# Patient Record
Sex: FEMALE | Race: WHITE | NOT HISPANIC OR LATINO | URBAN - METROPOLITAN AREA
[De-identification: names, ages, dates, MRNs, and addresses within clinical notes are randomized per-mention and may not be internally consistent; named-entity substitution may affect disease eponyms.]

---
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
## 2023-03-13 ENCOUNTER — OFFICE VISIT (OUTPATIENT)
Dept: FAMILY MEDICINE CLINIC | Facility: CLINIC | Age: 23
End: 2023-03-13
Payer: COMMERCIAL

## 2023-03-13 VITALS
TEMPERATURE: 98 F | WEIGHT: 217.63 LBS | RESPIRATION RATE: 18 BRPM | HEIGHT: 72 IN | OXYGEN SATURATION: 96 % | SYSTOLIC BLOOD PRESSURE: 118 MMHG | BODY MASS INDEX: 29.48 KG/M2 | HEART RATE: 88 BPM | DIASTOLIC BLOOD PRESSURE: 72 MMHG

## 2023-03-13 DIAGNOSIS — Z13.228 SCREENING FOR ENDOCRINE, METABOLIC, AND IMMUNITY DISORDER: ICD-10-CM

## 2023-03-13 DIAGNOSIS — Z13.29 SCREENING FOR ENDOCRINE, METABOLIC, AND IMMUNITY DISORDER: ICD-10-CM

## 2023-03-13 DIAGNOSIS — F42.9 OBSESSIVE-COMPULSIVE DISORDER, UNSPECIFIED TYPE: ICD-10-CM

## 2023-03-13 DIAGNOSIS — E66.3 OVERWEIGHT (BMI 25.0-29.9): ICD-10-CM

## 2023-03-13 DIAGNOSIS — Z13.6 SCREENING FOR CARDIOVASCULAR CONDITION: ICD-10-CM

## 2023-03-13 DIAGNOSIS — Z00.00 ANNUAL PHYSICAL EXAM: Primary | ICD-10-CM

## 2023-03-13 DIAGNOSIS — Z13.0 SCREENING FOR ENDOCRINE, METABOLIC, AND IMMUNITY DISORDER: ICD-10-CM

## 2023-03-13 DIAGNOSIS — F41.9 ANXIETY: ICD-10-CM

## 2023-03-13 PROBLEM — J30.2 SEASONAL ALLERGIES: Status: ACTIVE | Noted: 2023-03-13

## 2023-03-13 PROBLEM — K21.9 GASTROESOPHAGEAL REFLUX DISEASE: Status: ACTIVE | Noted: 2023-03-13

## 2023-03-13 PROCEDURE — 3078F DIAST BP <80 MM HG: CPT | Performed by: FAMILY MEDICINE

## 2023-03-13 PROCEDURE — 3008F BODY MASS INDEX DOCD: CPT | Performed by: FAMILY MEDICINE

## 2023-03-13 PROCEDURE — 99385 PREV VISIT NEW AGE 18-39: CPT | Performed by: FAMILY MEDICINE

## 2023-03-13 PROCEDURE — 3074F SYST BP LT 130 MM HG: CPT | Performed by: FAMILY MEDICINE

## 2023-03-13 RX ORDER — FLUOXETINE 10 MG/1
10 CAPSULE ORAL DAILY
COMMUNITY
Start: 2022-10-25 | End: 2023-03-13

## 2023-03-13 RX ORDER — OMEPRAZOLE 20 MG/1
20 CAPSULE, DELAYED RELEASE ORAL DAILY
COMMUNITY
Start: 2022-09-19 | End: 2023-03-13 | Stop reason: ALTCHOICE

## 2023-03-13 RX ORDER — FLUOXETINE HYDROCHLORIDE 20 MG/1
20 CAPSULE ORAL DAILY
Qty: 90 CAPSULE | Refills: 1 | Status: SHIPPED | OUTPATIENT
Start: 2023-03-13

## 2023-03-13 RX ORDER — ALUMINUM HYDROXIDE, MAGNESIUM HYDROXIDE, SIMETHICONE 400; 400; 40 MG/10ML; MG/10ML; MG/10ML
SUSPENSION ORAL
COMMUNITY
Start: 2022-09-19 | End: 2023-03-13 | Stop reason: ALTCHOICE

## 2023-03-13 RX ORDER — FLUOXETINE 10 MG/1
10 CAPSULE ORAL DAILY
Qty: 90 CAPSULE | Refills: 1 | Status: SHIPPED | OUTPATIENT
Start: 2023-03-13

## 2023-03-13 RX ORDER — ETONOGESTREL AND ETHINYL ESTRADIOL .12; .015 MG/D; MG/D
RING VAGINAL
COMMUNITY
Start: 2023-01-16

## 2023-03-13 RX ORDER — FLUOXETINE HYDROCHLORIDE 20 MG/1
20 CAPSULE ORAL DAILY
COMMUNITY
Start: 2022-10-24 | End: 2023-03-13

## 2023-03-16 LAB
ABSOLUTE BASOPHILS: 42 CELLS/UL (ref 0–200)
ABSOLUTE EOSINOPHILS: 11 CELLS/UL (ref 15–500)
ABSOLUTE LYMPHOCYTES: 2968 CELLS/UL (ref 850–3900)
ABSOLUTE MONOCYTES: 456 CELLS/UL (ref 200–950)
ABSOLUTE NEUTROPHILS: 1823 CELLS/UL (ref 1500–7800)
ALBUMIN/GLOBULIN RATIO: 2 (CALC) (ref 1–2.5)
ALBUMIN: 4.3 G/DL (ref 3.6–5.1)
ALKALINE PHOSPHATASE: 48 U/L (ref 31–125)
ALT: 13 U/L (ref 6–29)
AST: 18 U/L (ref 10–30)
BASOPHILS: 0.8 %
BILIRUBIN, TOTAL: 0.6 MG/DL (ref 0.2–1.2)
BUN: 10 MG/DL (ref 7–25)
CALCIUM: 9.7 MG/DL (ref 8.6–10.2)
CARBON DIOXIDE: 23 MMOL/L (ref 20–32)
CHLORIDE: 107 MMOL/L (ref 98–110)
CHOL/HDLC RATIO: 2.7 (CALC)
CHOLESTEROL, TOTAL: 159 MG/DL
CREATININE: 0.8 MG/DL (ref 0.5–0.96)
EGFR: 107 ML/MIN/1.73M2
EOSINOPHILS: 0.2 %
GLOBULIN: 2.1 G/DL (CALC) (ref 1.9–3.7)
GLUCOSE: 87 MG/DL (ref 65–99)
HDL CHOLESTEROL: 58 MG/DL
HEMATOCRIT: 43.6 % (ref 35–45)
HEMOGLOBIN: 14.3 G/DL (ref 11.7–15.5)
LDL-CHOLESTEROL: 87 MG/DL (CALC)
LYMPHOCYTES: 56 %
MCH: 30.6 PG (ref 27–33)
MCHC: 32.8 G/DL (ref 32–36)
MCV: 93.4 FL (ref 80–100)
MONOCYTES: 8.6 %
MPV: 10.8 FL (ref 7.5–12.5)
NEUTROPHILS: 34.4 %
NON-HDL CHOLESTEROL: 101 MG/DL (CALC)
PLATELET COUNT: 339 THOUSAND/UL (ref 140–400)
POTASSIUM: 4.3 MMOL/L (ref 3.5–5.3)
PROTEIN, TOTAL: 6.4 G/DL (ref 6.1–8.1)
RDW: 12.1 % (ref 11–15)
RED BLOOD CELL COUNT: 4.67 MILLION/UL (ref 3.8–5.1)
SODIUM: 139 MMOL/L (ref 135–146)
TRIGLYCERIDES: 58 MG/DL
TSH W/REFLEX TO FT4: 1.93 MIU/L
WHITE BLOOD CELL COUNT: 5.3 THOUSAND/UL (ref 3.8–10.8)

## 2023-04-03 ENCOUNTER — PATIENT MESSAGE (OUTPATIENT)
Dept: FAMILY MEDICINE CLINIC | Facility: CLINIC | Age: 23
End: 2023-04-03

## 2023-04-03 DIAGNOSIS — E66.3 OVERWEIGHT (BMI 25.0-29.9): Primary | ICD-10-CM

## 2023-05-16 ENCOUNTER — NURSE ONLY (OUTPATIENT)
Dept: FAMILY MEDICINE CLINIC | Facility: CLINIC | Age: 23
End: 2023-05-16
Payer: COMMERCIAL

## 2023-05-16 DIAGNOSIS — Z11.1 SCREENING FOR TUBERCULOSIS: Primary | ICD-10-CM

## 2023-05-16 PROCEDURE — 86580 TB INTRADERMAL TEST: CPT | Performed by: FAMILY MEDICINE

## 2023-05-18 ENCOUNTER — NURSE ONLY (OUTPATIENT)
Dept: FAMILY MEDICINE CLINIC | Facility: CLINIC | Age: 23
End: 2023-05-18
Payer: COMMERCIAL

## 2023-05-18 LAB — INDURATION (): NEGATIVE MM (ref 0–11)

## 2023-05-22 ENCOUNTER — OFFICE VISIT (OUTPATIENT)
Dept: INTERNAL MEDICINE CLINIC | Facility: CLINIC | Age: 23
End: 2023-05-22
Payer: COMMERCIAL

## 2023-05-22 VITALS
SYSTOLIC BLOOD PRESSURE: 100 MMHG | HEIGHT: 70.5 IN | DIASTOLIC BLOOD PRESSURE: 68 MMHG | HEART RATE: 72 BPM | RESPIRATION RATE: 18 BRPM | BODY MASS INDEX: 30.58 KG/M2 | OXYGEN SATURATION: 98 % | WEIGHT: 216 LBS

## 2023-05-22 DIAGNOSIS — F41.9 ANXIETY: ICD-10-CM

## 2023-05-22 DIAGNOSIS — Z51.81 THERAPEUTIC DRUG MONITORING: Primary | ICD-10-CM

## 2023-05-22 DIAGNOSIS — E66.9 OBESITY (BMI 30-39.9): ICD-10-CM

## 2023-05-22 PROBLEM — D64.9 ANEMIA: Status: ACTIVE | Noted: 2023-05-22

## 2023-05-22 PROCEDURE — 3008F BODY MASS INDEX DOCD: CPT | Performed by: NURSE PRACTITIONER

## 2023-05-22 PROCEDURE — 99213 OFFICE O/P EST LOW 20 MIN: CPT | Performed by: NURSE PRACTITIONER

## 2023-05-22 PROCEDURE — 3078F DIAST BP <80 MM HG: CPT | Performed by: NURSE PRACTITIONER

## 2023-05-22 PROCEDURE — 3074F SYST BP LT 130 MM HG: CPT | Performed by: NURSE PRACTITIONER

## 2023-05-22 NOTE — PATIENT INSTRUCTIONS
Welcome to the [facility]!!  Thank you for placing your trust in our health care team, I look forward to working with you along this journey to better health! Next steps:     1.  Schedule a personal nutrition consultation with one of our registered dieticians. Bring along your food journal (3 days minimum). See journal options below. 2.  get labs and EKG done   3. Check with insurance on coverage for GLP-1 medications:  (ie. saxenda- daily, wegovy- weekly)    Please try to work on the following dietary changes this first month:  Daily protein recommendation to start:  grams  Daily carbohydrate: <130g  Daily calories: 1,600  1. Drink water with meals and throughout the day, cut down on soda and/or juice if consumed. Consider flavored water options like Bubbly, Spindrift, Hint and Zheng. 2.  Eat breakfast daily and focus on having protein with each meal, examples include: greek yogurt, cottage cheese, hard boiled egg, whole grain toast with peanut butter. 3.  Reduce refined carbohydrates and sugars which includes items such as sweets, as well as rice, pasta, and bread and make sure to choose whole grain options when having them with just 1 serving per meal about the size of your inner palm. 4.  Consume non starchy veggies daily working towards making them a good 50% of your daily food intake. Add them to lunch and dinner consistently. 5.  Optional can start a daily probiotic: VSL#3, (order on line at www.vsl3. com). Take 1 capsule daily with water for 30 days, then reduce to 1 every other day (this will reduce the cost). Capsules can be left out for 2 weeks, but then must be refrigerated. Please download allison My Fitness Orvel Dubin! Or Net Diary to monitor daily dietary intake and you will be able to see if you are eating the right amount of calories or too much or too little which would hinder weight loss.  Additionally this will help to see your daily carbohydrate and protein intake. When you set the radha up choose 1-2 lbs/week as a goal.  Keeping a paper food journal is an option as well to remain accountable for your choices- this is the start to mindful eating! A low calorie diet has been consistently shown to support weight loss. Continue or start exercising to help establish a routine. If not already exercising begin with 1 day and progress as able with long-term goal of 30 minutes 5 days a week at a minimum. Meditation daily can help manage and control stress. Chronic stress can make weight loss difficult. Exercising is one way to help with stress, but meditation using the CALM Radha or another comparable alternative can be done in your home or place of work with little time commitment. This Radha can also help work on behavior change and improve sleep. Check out the segment under Calm Masterclass and listen to The 4 Pillars of Health. A great way to begin learning about the foundation of lifestyle with practical tips to use in your every day. Check out www.yourweightmatters. org blog for continued daily support and education along this weight loss journey! Patient Resources:     Personal Training/Fitness Classes/Health Coaching     Joo Mccallum and Solares Sophiaside @ [email or web address] Full fitness center with group fitness and personal training. Discount available as client of [facility]. Health Coaching and Personal Training with Grant Lopez at our [facility]- individual weekly coaching with option to add personal training and small group fitness classes targeted at weight loss- [phone] and/or email @ [email or web address]  [email or web address] Bj [email or web address] Group Fitness [phone] and/or email Walter Chin at [email or web address]  [address] with multiple locations: German (www.Haw[place] [employer]otech), Eat The Duvall & Minor (www.eatPeople's Software Company. Sagacity Media), Inofile Fitness (www.CellPly), The Exercise  (www.exercisecoach.Sagacity Media)     Online Fitness  Fitness  on [employer] in 10 DVD series- www. bitam37RYC. Sagacity Media  Sit and Be Fit - Chair exercise series Www.sitandbefit. org  Hip Hop Fit with Kirit Forbes at www.hiphopfit. net     Apps for on DaoliCloud 7 Minute Workout (orange box with white 7) - free on the go HIIT training radha  Peloton Radha @ Access Mobile     Nutrition Trackers and Tools  LoseIT! And My Fitness Pal apps and on line for tracking nutrition  NOOM - virtual health coaching  FitFoundation (healthy meals on the go) in [employer] @ wwwLoopFuse [email or web address]. Nikki Chinchilla MD @ www.Micromidas and Viridiana Bernal (keto and low carb plans recommended) @ www. [email or web address].[facility], Metabolic Meals @ www. MyMetabolicMeals. com - individual prepared meals to go  MusicSiren, Pro[employer]i [employer]rapeutics, [employer], Every Plate, NatureWorks- on line meal delivery programs for preparation at home  [facility] in [place] for homemade meals to go @ wwwLoopFusemealInotek Pharmaceuticals. [employer]acity Media  Diet Doctor @ www. dietdoctor. [employer] Media - low carb swaps  Stamped - meal prep and planning radha (www.yummly. com)     Stress Management/Behavior/Mindful Eating  CALM meditation radha (www.calm. Sagacity Media)  Headspace  Am I Hungry? Mindful eating virtual  radha  Www.yourweightmatters. org - Obesity Action Coalition sponsored Blog posts daily  Motivation radha (black box with white \")- daily supportive messages sent to your phone     Books/Video Education/Podcasts  Mindless Eating by Luis F Mcknight  Why We Get Sick by Carli Mosqueda (a book about insulin resistance)  Atomic Habits by Evertt Barthel (a book about taking small steps to promote greater behavior change)   Can't Hurt Me by Marcy Hutchins (a book exploring the power of discipline in achieving your goals)  The End of Dieting: How to Live for Life by Dr. Faylene Fanti, M.D. or listen to The [facility] Episode 61:  Understanding \"Nutritarian\" Eating w/Dr. Ros Dunn  Your Body in Balance: The [employer] of Food, Hormones, and Health by Dr. Mani Freeman  The Menopause Diet Plan by Marichuy Wilson and [facility] - [facility] AT [facility]  The Complete Guide to fasting by Dr. Jose Miguel Corbett, [address] [facility] by Lavonne Neumann, Ph.D, R.D. Weight Loss Surgery Will Not Treat Food Addiction by Charla Recinos Ph.D  The [address] [place] [address] on plant based nutrition  Fed Up - documentary about obesity (Free on [facility])  The Truth About Sugar - documentary on sugar (Free on Porphyrio, https://youtu. be/2C4ffbzEF9u)  The Dr. Shyann Emanuel by Dr. Evette Gallardo MD  Fitlosophy Fitspiration - journal to better health (found at [employer] in fitness aisle)  What Happened to You?- a look at the impact trauma has on behavior written by Luciana Ibarra and Dr. Magno Alonzo Again by [place] - discovering your true self after trauma  Venus Salcido talk on Team Apart, The Call to Courage  Podcasts: The Exam Room by the Physician's Committee, Nutrition Facts by Dr. Zenon Black    We are here to support you with weight loss, but please remember that you still need your primary care provider for your routine health maintenance.

## 2023-05-24 ENCOUNTER — OFFICE VISIT (OUTPATIENT)
Dept: INTERNAL MEDICINE CLINIC | Facility: CLINIC | Age: 23
End: 2023-05-24
Payer: COMMERCIAL

## 2023-05-24 DIAGNOSIS — E66.9 OBESITY (BMI 30-39.9): ICD-10-CM

## 2023-05-24 DIAGNOSIS — Z51.81 THERAPEUTIC DRUG MONITORING: ICD-10-CM

## 2023-05-24 PROCEDURE — 97802 MEDICAL NUTRITION INDIV IN: CPT

## 2023-06-15 ENCOUNTER — EMPLOYEE HEALTH (OUTPATIENT)
Dept: OTHER | Facility: HOSPITAL | Age: 23
End: 2023-06-15
Attending: PREVENTIVE MEDICINE

## 2023-06-15 DIAGNOSIS — Z11.1 SCREENING-PULMONARY TB: Primary | ICD-10-CM

## 2023-08-02 ENCOUNTER — OFFICE VISIT (OUTPATIENT)
Dept: INTERNAL MEDICINE CLINIC | Facility: CLINIC | Age: 23
End: 2023-08-02
Payer: COMMERCIAL

## 2023-08-02 VITALS
HEIGHT: 70.5 IN | RESPIRATION RATE: 14 BRPM | BODY MASS INDEX: 31.14 KG/M2 | HEART RATE: 70 BPM | SYSTOLIC BLOOD PRESSURE: 108 MMHG | DIASTOLIC BLOOD PRESSURE: 62 MMHG | WEIGHT: 220 LBS

## 2023-08-02 DIAGNOSIS — Z51.81 THERAPEUTIC DRUG MONITORING: Primary | ICD-10-CM

## 2023-08-02 DIAGNOSIS — F41.9 ANXIETY: ICD-10-CM

## 2023-08-02 DIAGNOSIS — G47.26 SHIFT WORK SLEEP DISORDER: ICD-10-CM

## 2023-08-02 DIAGNOSIS — E66.9 OBESITY (BMI 30-39.9): ICD-10-CM

## 2023-08-02 PROCEDURE — 99213 OFFICE O/P EST LOW 20 MIN: CPT | Performed by: NURSE PRACTITIONER

## 2023-08-02 PROCEDURE — 3078F DIAST BP <80 MM HG: CPT | Performed by: NURSE PRACTITIONER

## 2023-08-02 PROCEDURE — 3008F BODY MASS INDEX DOCD: CPT | Performed by: NURSE PRACTITIONER

## 2023-08-02 PROCEDURE — 3074F SYST BP LT 130 MM HG: CPT | Performed by: NURSE PRACTITIONER

## 2023-08-02 NOTE — PATIENT INSTRUCTIONS
Next steps:  1.  check to see if insurance will cover either wegovy or saxenda  Get blood work done  2. Schedule a personal nutrition consultation with one of our registered dieticians        1. Drink water with meals and throughout the day, cut down on soda and/or juice if consumed. Consider flavored water options like Bubbly, Spindrift, Hint and Zheng. 2.  Eat breakfast daily and focus on having protein with each meal, examples include: greek yogurt, cottage cheese, hard boiled egg, whole grain toast with peanut butter. 3.  Reduce refined carbohydrates and sugars which includes items such as sweets, as well as rice, pasta, and bread and make sure to choose whole grain options when having them with just 1 serving per meal about the size of your inner palm. 4.  Consume non starchy veggies daily working towards making them a good 50% of your daily food intake. Add them to lunch and dinner consistently. 5.  Start a daily probiotic: VSL#3 is recommended, (order on line at www.vsl3. com). Take 1 capsule daily with water for 30 days, then reduce to 1 every other day (this will reduce the cost). Capsules can be left out for 2 weeks, but then must be refrigerated. Please download allison My Fitness Javed Focal Point Energy! Or Net Diary to monitor daily dietary intake and you will be able to see if you are eating the right amount of calories or too much or too little which would hinder weight loss. Additionally this will help to see your daily carbohydrate and protein intake. When you set the allison up choose 1-2 lbs/week as a goal.  Keeping a paper food journal is an option as well to remain accountable for your choices- this is the start to mindful eating! A low calorie diet has been consistently shown to support weight loss. Continue or start exercising to help establish a routine. If not already exercising begin with 1 day and progress as able with long-term goal of 30 minutes 5 days a week at a minimum.      Meditation daily can help manage and control stress. Chronic stress can make weight loss difficult. Exercising is one way to help with stress, but meditation using the CALM Radha or another comparable alternative can be done in your home or place of work with little time commitment. This Radha can also help work on behavior change and improve sleep. Check out the segment under Calm Masterclass and listen to The 4 Pillars of Health. A great way to begin learning about the foundation of lifestyle with practical tips to use in your every day. Check out www.yourweightmatters. org blog for continued daily support and education along this weight loss journey! Patient Resources:     Personal Training/Fitness Classes/Health Coaching     Joo 112 and [place] Sophiaside @ [email or web address] Full fitness center with group fitness and personal training. Discount available as client of [facility]. Health Coaching and Personal Training with [place] at our [facility]- individual weekly coaching with option to add personal training and small group fitness classes targeted at weight loss- [phone] and/or email @ [email or web address]  360FIT [place] [email or web address] Group Fitness [phone] and/or email Kim Sosa at [email or web address]  [address] [facility] with multiple locations: Aetna (www.ybuy), Eat The Parkt Fitness (www.iFit. Aehr Test Systems), Fit Body Bootcamp (www.Auto SecurebodybootGreenBytesp.Aehr Test Systems), Remark Media (www.Tesla Motors. Aehr Test Systems), The Exercise  (www.exercisecoach.Aehr Test Systems)     Online Fitness  Fitness  on [employer] in 10 DVD series- www. pmawm61JZU. Aehr Test Systems  Sit and Be Fit - Chair exercise series Www.sitandbefit. org  Hip Hop Fit with Kirit Forbes at www.hiphopfit. net     Apps for on the eTask.it 7 Minute Workout (orange box with white 7) - free on the go HIIT training radha  Peloton Radha @ www. Keemotion     Nutrition Trackers and Tools  LoseIT! And My Fitness Pal apps and on line for tracking nutrition  NOOM - virtual health coaching  FitFoundation (healthy meals on the go) in [facility] @ www. vkibemsqgqhnn8o. Eric Ray MD @ www.Cinema Oned.com and Ashley Courtney (keto and low carb plans recommended) @ www. [email or web address].OKF, Metabolic Meals @ www. MyMetabolicMeals. com - individual prepared meals to go  Burbio.com, Domosite, International Business Machines, Every Plate, Salon Media Group- on line meal delivery programs for preparation at home  [place] Yoopies in [place] for homemade meals to go @ wwwHardPoint Protective Group  Diet Doctor @ www. dietdoctor. com - low carb swaps  Yummly - meal prep and planning radha (www.yummly. com)     Stress Management/Behavior/Mindful Eating  CALM meditation radha (www.FOI Corporation)  Headspace  Am I Hungry? Mindful eating virtual  radha  Www.yourweightmatters. org - Obesity Action Coalition sponsored Blog posts daily  Motivation radha (black box with white \")- daily supportive messages sent to your phone     Books/Video Education/Podcasts  Mindless Eating by Cheli Fuller  Why We Get Sick by Main Das (a book about insulin resistance)  Atomic Habits by Ying Queen (a book about taking small steps to promote greater behavior change)   Can't Hurt Me by Marques Stagefaith (a book exploring the power of discipline in achieving your goals)  The End of Dieting: How to Live for Life by Dr. Cari Nuñez M.D. or listen to The [facility] Episode 61: Understanding \"Nutritarian\" Eating w/Dr. Cari Nuñez  Your Body in Balance: The World Fuel Services Corporation of Food, Hormones, and Health by Dr. Aneudy Humphrey  The Menopause Diet Plan by Salvador Sanchez and [facility]  The Complete Guide to fasting by Dr. Ashley Montanez, [address] [facility] by Yoselyn Chávez, Ph.D, R.D.   Weight Loss Surgery Will Not Treat Food Addiction by Eliseo Olea Ph.D  The Game Changers- Samba TV Documentary on plant based nutrition  Fed Up - documentary about obesity (Free on Utube)  The Truth About Sugar - documentary on sugar (Free on Utube, https://youtu. be/4N3tonvXU7b)  The Dr. Sera Arguelles by Dr. Estephania Bai MD  Fitlosophy Fitspiration - journal to better health (found at [employer] in fitness aisle)  What Happened to You?- a look at the impact trauma has on behavior written by Jackie Shaffer and Dr. Bishop Cruz Again by Delmar Mead - discovering your true self after trauma  Italia Navarro talk on [address], The Call to Courage  Podcasts: The Exam Room by the Physician's Committee, Nutrition Facts by Dr. Luis F Lemons    We are here to support you with weight loss, but please remember that you still need your primary care provider for your routine health maintenance.

## 2023-09-19 ENCOUNTER — TELEPHONE (OUTPATIENT)
Dept: FAMILY MEDICINE CLINIC | Facility: CLINIC | Age: 23
End: 2023-09-19

## 2023-09-19 DIAGNOSIS — F41.9 ANXIETY: ICD-10-CM

## 2023-09-19 DIAGNOSIS — F42.9 OBSESSIVE-COMPULSIVE DISORDER, UNSPECIFIED TYPE: ICD-10-CM

## 2023-12-20 ENCOUNTER — NURSE ONLY (OUTPATIENT)
Dept: FAMILY MEDICINE CLINIC | Facility: CLINIC | Age: 23
End: 2023-12-20
Payer: COMMERCIAL

## 2023-12-20 DIAGNOSIS — Z11.1 SCREENING-PULMONARY TB: Primary | ICD-10-CM

## 2023-12-20 PROCEDURE — 86580 TB INTRADERMAL TEST: CPT | Performed by: FAMILY MEDICINE

## 2023-12-22 ENCOUNTER — NURSE ONLY (OUTPATIENT)
Dept: FAMILY MEDICINE CLINIC | Facility: CLINIC | Age: 23
End: 2023-12-22
Payer: COMMERCIAL

## 2023-12-22 LAB — INDURATION (): 0 MM (ref 0–11)

## 2024-02-29 LAB
VITAMIN B12: 351 PG/ML (ref 200–1100)
VITAMIN D, 25-OH, TOTAL: 31 NG/ML (ref 30–100)

## 2024-03-18 ENCOUNTER — OFFICE VISIT (OUTPATIENT)
Dept: FAMILY MEDICINE CLINIC | Facility: CLINIC | Age: 24
End: 2024-03-18
Payer: COMMERCIAL

## 2024-03-18 VITALS
RESPIRATION RATE: 18 BRPM | WEIGHT: 242 LBS | SYSTOLIC BLOOD PRESSURE: 118 MMHG | DIASTOLIC BLOOD PRESSURE: 80 MMHG | BODY MASS INDEX: 34 KG/M2 | TEMPERATURE: 97 F | HEART RATE: 70 BPM | OXYGEN SATURATION: 100 %

## 2024-03-18 DIAGNOSIS — R39.15 URGENCY OF URINATION: ICD-10-CM

## 2024-03-18 DIAGNOSIS — N30.01 ACUTE CYSTITIS WITH HEMATURIA: Primary | ICD-10-CM

## 2024-03-18 DIAGNOSIS — R30.9 PAIN WITH URINATION: ICD-10-CM

## 2024-03-18 DIAGNOSIS — R30.0 BURNING WITH URINATION: ICD-10-CM

## 2024-03-18 LAB
BILIRUBIN: NEGATIVE
GLUCOSE (URINE DIPSTICK): NEGATIVE MG/DL
KETONES (URINE DIPSTICK): NEGATIVE MG/DL
MULTISTIX LOT#: ABNORMAL NUMERIC
NITRITE, URINE: NEGATIVE
PH, URINE: 6 (ref 4.5–8)
PROTEIN (URINE DIPSTICK): 30 MG/DL
SPECIFIC GRAVITY: 1.01 (ref 1–1.03)
UROBILINOGEN,SEMI-QN: 0.2 MG/DL (ref 0–1.9)

## 2024-03-18 PROCEDURE — 87186 SC STD MICRODIL/AGAR DIL: CPT | Performed by: FAMILY MEDICINE

## 2024-03-18 PROCEDURE — 87088 URINE BACTERIA CULTURE: CPT | Performed by: FAMILY MEDICINE

## 2024-03-18 PROCEDURE — 87086 URINE CULTURE/COLONY COUNT: CPT | Performed by: FAMILY MEDICINE

## 2024-03-18 RX ORDER — SULFAMETHOXAZOLE AND TRIMETHOPRIM 800; 160 MG/1; MG/1
1 TABLET ORAL 2 TIMES DAILY
Qty: 14 TABLET | Refills: 0 | Status: SHIPPED | OUTPATIENT
Start: 2024-03-18 | End: 2024-03-25

## 2024-03-18 RX ORDER — CHOLECALCIFEROL (VITAMIN D3) 125 MCG
500 CAPSULE ORAL DAILY
COMMUNITY

## 2024-03-18 RX ORDER — MULTIVIT-MIN/IRON/FOLIC ACID/K 18-600-40
CAPSULE ORAL DAILY
COMMUNITY

## 2024-03-18 NOTE — PROGRESS NOTES
HPI:     Rossy Boland is a 23 year old female presents for    Possible UTI for the past 3 days.  She normally sees Dr. Wylie.  Has mainly been experiencing urinary frequency and hesitancy.  Also feels dysuria at the end of urination.  Has had to sit on the toilet for hours.  No f/c, no n/v.  No back pain.  No vaginal discharge.        Medications (Active prior to today's visit):  Current Outpatient Medications   Medication Sig Dispense Refill    cyanocobalamin 500 MCG Oral Tab Take 1 tablet (500 mcg total) by mouth daily.      Cholecalciferol (VITAMIN D) 50 MCG (2000 UT) Oral Cap Take by mouth daily.      ELURYNG 0.12-0.015 MG/24HR Vaginal Ring          Allergies:  Allergies   Allergen Reactions    Seasonal UNKNOWN       PSFH elements reviewed from today and agreed except as otherwise stated in HPI.  ROS:      Pertinent positives and negatives noted in the the HPI.    PHYSICAL EXAM:     Vitals:    03/18/24 1108   BP: 118/80   BP Location: Right arm   Patient Position: Sitting   Cuff Size: large   Pulse: 70   Resp: 18   Temp: 97.2 °F (36.2 °C)   TempSrc: Temporal   SpO2: 100%   Weight: 242 lb (109.8 kg)     Vital signs reviewed.Appears stated age, well groomed.  Physical Exam  Constitutional:       Appearance: Normal appearance.   Cardiovascular:      Rate and Rhythm: Normal rate and regular rhythm.      Pulses: Normal pulses.      Heart sounds: No murmur heard.     No friction rub. No gallop.   Pulmonary:      Effort: Pulmonary effort is normal. No respiratory distress.      Breath sounds: No wheezing, rhonchi or rales.   Abdominal:      General: Bowel sounds are normal. There is no distension.      Palpations: Abdomen is soft.      Tenderness: There is no abdominal tenderness. There is no right CVA tenderness or left CVA tenderness.      Comments: obese   Musculoskeletal:         General: No tenderness.      Cervical back: Neck supple.      Right lower leg: No edema.      Left lower leg: No edema.    Skin:     General: Skin is warm.   Neurological:      General: No focal deficit present.      Mental Status: She is alert.   Psychiatric:         Mood and Affect: Mood normal.         Speech: Speech normal.         Behavior: Behavior normal.        Results for orders placed or performed in visit on 03/18/24   Urine Dip, auto without Micro    Collection Time: 03/18/24 11:18 AM   Result Value Ref Range    Glucose Urine Negative Negative mg/dL    Bilirubin Urine Negative Negative    Ketones, UA Negative Negative - Trace mg/dL    Spec Gravity 1.015 1.005 - 1.030    Blood Urine Moderate (A) Negative    PH Urine 6.0 5.0 - 8.0    Protein Urine 30 (A) Negative - Trace mg/dL    Urobilinogen Urine 0.2 0.2 - 1.0 mg/dL    Nitrite Urine Negative Negative    Leukocyte Esterase Urine Large (A) Negative    APPEARANCE cloudy Clear    Color Urine straw Yellow    Multistix Lot# [id number] Numeric    Multistix Expiration Date 12/31/2024 Date       ASSESSMENT/PLAN:   23 year old female with    1. Acute cystitis with hematuria    2. Urgency of urination    3. Pain with urination    4. Burning with urination      U/a consistent with UTI-will treat with bactrim bid x 7 days.  Urine culture pending.  May need to change abx based on sensitivities   D/w pt f/u with PCP to ensure resolution of hematuria after completion of symptoms and abx   F/u with PCP as previously recommended by PCP    Patient/Caregiver Education: There are no barriers to learning. Medical education done.   Outcome: Patient verbalizes understanding and agrees with plan. Advised to call or RTC if symptoms persist or worsen.    3/18/2024  Tamara Acevedo, DO    Patient understands plan and follow-up.

## 2024-04-03 ENCOUNTER — OFFICE VISIT (OUTPATIENT)
Dept: FAMILY MEDICINE CLINIC | Facility: CLINIC | Age: 24
End: 2024-04-03
Payer: COMMERCIAL

## 2024-04-03 VITALS
DIASTOLIC BLOOD PRESSURE: 76 MMHG | BODY MASS INDEX: 32.98 KG/M2 | SYSTOLIC BLOOD PRESSURE: 112 MMHG | OXYGEN SATURATION: 98 % | WEIGHT: 240.81 LBS | RESPIRATION RATE: 18 BRPM | HEART RATE: 71 BPM | HEIGHT: 71.85 IN | TEMPERATURE: 98 F

## 2024-04-03 DIAGNOSIS — Z13.21 SCREENING FOR ENDOCRINE, NUTRITIONAL, METABOLIC AND IMMUNITY DISORDER: ICD-10-CM

## 2024-04-03 DIAGNOSIS — Z13.29 SCREENING FOR ENDOCRINE, NUTRITIONAL, METABOLIC AND IMMUNITY DISORDER: ICD-10-CM

## 2024-04-03 DIAGNOSIS — Z13.0 SCREENING FOR ENDOCRINE, NUTRITIONAL, METABOLIC AND IMMUNITY DISORDER: ICD-10-CM

## 2024-04-03 DIAGNOSIS — Z13.6 SCREENING FOR ISCHEMIC HEART DISEASE: ICD-10-CM

## 2024-04-03 DIAGNOSIS — F42.9 OBSESSIVE-COMPULSIVE DISORDER, UNSPECIFIED TYPE: ICD-10-CM

## 2024-04-03 DIAGNOSIS — F41.9 ANXIETY: ICD-10-CM

## 2024-04-03 DIAGNOSIS — Z13.228 SCREENING FOR ENDOCRINE, NUTRITIONAL, METABOLIC AND IMMUNITY DISORDER: ICD-10-CM

## 2024-04-03 DIAGNOSIS — Z00.00 ANNUAL PHYSICAL EXAM: Primary | ICD-10-CM

## 2024-04-03 DIAGNOSIS — Z23 NEED FOR VACCINATION: ICD-10-CM

## 2024-04-03 DIAGNOSIS — Z11.8 SCREENING FOR CHLAMYDIAL DISEASE: ICD-10-CM

## 2024-04-03 LAB
ALBUMIN SERPL-MCNC: 3.6 G/DL (ref 3.4–5)
ALBUMIN/GLOB SERPL: 1.1 {RATIO} (ref 1–2)
ALP LIVER SERPL-CCNC: 45 U/L
ALT SERPL-CCNC: 74 U/L
ANION GAP SERPL CALC-SCNC: 6 MMOL/L (ref 0–18)
AST SERPL-CCNC: 73 U/L (ref 15–37)
BASOPHILS # BLD AUTO: 0.04 X10(3) UL (ref 0–0.2)
BASOPHILS NFR BLD AUTO: 0.6 %
BILIRUB SERPL-MCNC: 0.4 MG/DL (ref 0.1–2)
BUN BLD-MCNC: 10 MG/DL (ref 9–23)
CALCIUM BLD-MCNC: 9 MG/DL (ref 8.5–10.1)
CHLORIDE SERPL-SCNC: 111 MMOL/L (ref 98–112)
CHOLEST SERPL-MCNC: 189 MG/DL (ref ?–200)
CO2 SERPL-SCNC: 24 MMOL/L (ref 21–32)
CREAT BLD-MCNC: 0.96 MG/DL
EGFRCR SERPLBLD CKD-EPI 2021: 86 ML/MIN/1.73M2 (ref 60–?)
EOSINOPHIL # BLD AUTO: 0.02 X10(3) UL (ref 0–0.7)
EOSINOPHIL NFR BLD AUTO: 0.3 %
ERYTHROCYTE [DISTWIDTH] IN BLOOD BY AUTOMATED COUNT: 12.5 %
FASTING PATIENT LIPID ANSWER: YES
FASTING STATUS PATIENT QL REPORTED: YES
GLOBULIN PLAS-MCNC: 3.4 G/DL (ref 2.8–4.4)
GLUCOSE BLD-MCNC: 89 MG/DL (ref 70–99)
HCT VFR BLD AUTO: 42.4 %
HDLC SERPL-MCNC: 62 MG/DL (ref 40–59)
HGB BLD-MCNC: 14.2 G/DL
IMM GRANULOCYTES # BLD AUTO: 0.01 X10(3) UL (ref 0–1)
IMM GRANULOCYTES NFR BLD: 0.2 %
LDLC SERPL CALC-MCNC: 116 MG/DL (ref ?–100)
LYMPHOCYTES # BLD AUTO: 2.94 X10(3) UL (ref 1–4)
LYMPHOCYTES NFR BLD AUTO: 46.6 %
MCH RBC QN AUTO: 30 PG (ref 26–34)
MCHC RBC AUTO-ENTMCNC: 33.5 G/DL (ref 31–37)
MCV RBC AUTO: 89.6 FL
MONOCYTES # BLD AUTO: 0.52 X10(3) UL (ref 0.1–1)
MONOCYTES NFR BLD AUTO: 8.2 %
NEUTROPHILS # BLD AUTO: 2.78 X10 (3) UL (ref 1.5–7.7)
NEUTROPHILS # BLD AUTO: 2.78 X10(3) UL (ref 1.5–7.7)
NEUTROPHILS NFR BLD AUTO: 44.1 %
NONHDLC SERPL-MCNC: 127 MG/DL (ref ?–130)
OSMOLALITY SERPL CALC.SUM OF ELEC: 291 MOSM/KG (ref 275–295)
PLATELET # BLD AUTO: 394 10(3)UL (ref 150–450)
POTASSIUM SERPL-SCNC: 3.9 MMOL/L (ref 3.5–5.1)
PROT SERPL-MCNC: 7 G/DL (ref 6.4–8.2)
RBC # BLD AUTO: 4.73 X10(6)UL
SODIUM SERPL-SCNC: 141 MMOL/L (ref 136–145)
TRIGL SERPL-MCNC: 58 MG/DL (ref 30–149)
TSI SER-ACNC: 1.78 MIU/ML (ref 0.36–3.74)
VLDLC SERPL CALC-MCNC: 10 MG/DL (ref 0–30)
WBC # BLD AUTO: 6.3 X10(3) UL (ref 4–11)

## 2024-04-03 PROCEDURE — 90471 IMMUNIZATION ADMIN: CPT | Performed by: FAMILY MEDICINE

## 2024-04-03 PROCEDURE — 90715 TDAP VACCINE 7 YRS/> IM: CPT | Performed by: FAMILY MEDICINE

## 2024-04-03 PROCEDURE — 36415 COLL VENOUS BLD VENIPUNCTURE: CPT | Performed by: FAMILY MEDICINE

## 2024-04-03 PROCEDURE — 99395 PREV VISIT EST AGE 18-39: CPT | Performed by: FAMILY MEDICINE

## 2024-04-03 PROCEDURE — 3078F DIAST BP <80 MM HG: CPT | Performed by: FAMILY MEDICINE

## 2024-04-03 PROCEDURE — 3074F SYST BP LT 130 MM HG: CPT | Performed by: FAMILY MEDICINE

## 2024-04-03 PROCEDURE — 80061 LIPID PANEL: CPT | Performed by: FAMILY MEDICINE

## 2024-04-03 PROCEDURE — 80050 GENERAL HEALTH PANEL: CPT | Performed by: FAMILY MEDICINE

## 2024-04-03 PROCEDURE — 87491 CHLMYD TRACH DNA AMP PROBE: CPT | Performed by: FAMILY MEDICINE

## 2024-04-03 PROCEDURE — 3008F BODY MASS INDEX DOCD: CPT | Performed by: FAMILY MEDICINE

## 2024-04-03 PROCEDURE — 87591 N.GONORRHOEAE DNA AMP PROB: CPT | Performed by: FAMILY MEDICINE

## 2024-04-03 NOTE — PROGRESS NOTES
Patient came in for draw of ordered fasting labs. Patient drawn out of right AC, x 1 attempt and tolerated well.  Light green lavender  tube drawn.

## 2024-04-04 DIAGNOSIS — E78.2 MIXED HYPERLIPIDEMIA: ICD-10-CM

## 2024-04-04 DIAGNOSIS — R79.89 ELEVATED LFTS: Primary | ICD-10-CM

## 2024-04-04 LAB
C TRACH DNA SPEC QL NAA+PROBE: NEGATIVE
N GONORRHOEA DNA SPEC QL NAA+PROBE: NEGATIVE

## 2024-04-05 NOTE — PROGRESS NOTES
Chief Complaint   Patient presents with    Physical     Annual exam w/o pap        HPI:   Rossy Boland is a 23 year old female who presents for a complete physical without gyne exam.   Patient feels well, dental visit up to date, no hearing problem.  Vaccinations Tdap today    LMP: 3/09/24  Sexual activity:monogamy w/ boyfriend  Contraception: vaginal ring  Exercise: none.  Diet:  regular    Wt Readings from Last 3 Encounters:   04/03/24 240 lb 12.8 oz (109.2 kg)   03/18/24 242 lb (109.8 kg)   08/02/23 220 lb (99.8 kg)      BP Readings from Last 3 Encounters:   04/03/24 112/76   03/18/24 118/80   08/02/23 108/62     Patient's last menstrual period was 03/09/2024 (exact date).     Annual physical:  Overall pt states she feels well. 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    LMP: 3/09/24  Sexually Active: monogamous with boyfriend   Last pap smear: will be seeing Gyne at home in [place]  Last mammogram: n/a  Last colonoscopy: n/a  Last DEXA Scan: n/a    Exercise: none  Diet: regular    Anxiety/OCD: Pt weaned off of Fluoxetine in 1/2024, but was unintentional.  She reports life changes- her mother was hospitalized in [place] in 1/2024 and passed away the following month. Pt continued through nursing school at this time.  She was taking just the 20 mg tablets daily for awhile (since she did not have her 10 mg tablet at the time), then had the 10 mg tablets which she took daily and weaned off without any issues.  She reports she has been doing well without medication, anxiety is well controlled. She has a good appetite, is sleeping well. She knows she needs to take better care of herself now that school will be ending.     Previous HPI from 9/2023: Pt continues to do well on the Fluoxetine. There are times she feels really well and thinks that she no longer has to be on the medication, then she has to remind herself that means the medication is working and she should continue. 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  She graduates in 5/2024. She feels she has good control of her life right now. Energy level is good. Sleeping well.  She has normal anxiety- with upcoming tests or overthinking things, but no anxiety attacks or panic attacks. She hopes to work on her self more after graduation, establish therapy again when he schedule is less frantic , so that she can wean off of medication.  She is in a serious relationship with her boyfriend and wants to have all of this sorted out before she brings a child in the world so that \"I can be a solid parent.\"  She sees anxiety in her mother and grandmother (undiagnosed) and thinks they should have had therapy before having her.       Previous HPI from 3/2023:  Pt has been treated for anxiety/depression and OCD.  She had been taking Fluoxetine 30 mg, which was working well for her, but has been off of this medication for 3 months.  She moved here 3 yrs ago, was having roommate issues and was at the height of Covid.  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  She has a supportive boyfriend.  She was in therapy int he past, but has been managing ok without since last year, since she had been doing well on Fluoxetine alone. She sometimes gets panic attacks.  She had tried to get a refill from her previous PCP, but was unable to do so.  Also her previous PCP was a 40 min drive.  She would like to restart her Fluoxetine since her uncontrolled anxiety is aggravating the OCD. Appetite is ok. She is sleeping well. She has no SI.                Current Outpatient Medications   Medication Sig Dispense Refill    Cholecalciferol (VITAMIN D) 50 MCG (2000 UT) Oral Cap Take by mouth daily.      ELURYNG 0.12-0.015 MG/24HR Vaginal Ring       cyanocobalamin 500 MCG Oral Tab Take 1 tablet (500 mcg total) by mouth daily.        Past Medical History:   Diagnosis Date    Anxiety 8/2020      History reviewed.  No pertinent surgical history.   History reviewed. No pertinent family history.   Social History:  Social History     Socioeconomic History    Marital status: Single   Occupational History    Occupation: [employer] tech   Tobacco Use    Smoking status: Never    Smokeless tobacco: Never   Substance and Sexual Activity    Alcohol use: Not Currently    Drug use: Never   Other Topics Concern    Caffeine Concern No    Exercise Yes    Seat Belt Yes    Special Diet No    Stress Concern No    Weight Concern Yes       Allergies:  Allergies   Allergen Reactions    Seasonal UNKNOWN        REVIEW OF SYSTEMS:     Review of Systems   Constitutional:  Negative for appetite change and fatigue.   Gastrointestinal:  Negative for abdominal pain, constipation, diarrhea, nausea and vomiting.   Genitourinary:  Negative for dysuria and menstrual problem.   Psychiatric/Behavioral:  Negative for decreased concentration, dysphoric mood, sleep disturbance and suicidal ideas. The patient is not nervous/anxious.         EXAM:   /76 (BP Location: Left arm, Patient Position: Sitting, Cuff Size: large)   Pulse 71   Temp 98.1 °F (36.7 °C) (Temporal)   Resp 18   Ht 5' 11.85\" (1.825 m)   Wt 240 lb 12.8 oz (109.2 kg)   LMP 03/09/2024 (Exact Date)   SpO2 98%   BMI 32.79 kg/m²    GENERAL: WD/WN in no acute distress.   HEENT: PERRLA and EOMI.  OP moist no lesions.TM WNL, chet.Normal ears canals bilaterally.  Neck is supple, with no cervical LAD or thyroid abnormalities.  LUNGS: are clear to auscultation bilaterally, with no wheeze, rhonchi, or rales.   HEART: is RRR.  S1, S2, with no murmurs,clicks, gallops  BREAST:deferred.  ABDOMEN: is soft,NBS, NT/ND with no HSM.  No rebound or guarding. No CVA tenderness, no hernias.   EXAM: deferred  RECTAL EXAM: deferred  NEURO: Cranial nerves II-XII normal,no focal abnormalities, and reflexes coordination and gait normal and symmetric.Sensation intact.  EXTREMETIES: are symmetric with no cyanosis,  clubbing, or edema.  MS: Normal muscles tones, no joints abnormalities.  SKIN: Normal color, turgor, no lesions, rashes or wounds.  PSYCH: normal affect and mood.    ASSESSMENT AND PLAN:     23 year old female with     1. Annual physical exam  Routine labs ordered today, await results. Counseled pt on healthy lifestyle changes. Vaccines today: UTD . Contraception: vaginal ring .     Last pap: she is going to see her Gyn back in [place] for pap smear    - CBC W Differential W Platelet [E]; Future  - Comp Metabolic Panel (14) [E]; Future  - TSH W Reflex To Free T4 [E]; Future  - Lipid Panel [E]; Future  - CBC W Differential W Platelet [E]  - Comp Metabolic Panel (14) [E]  - TSH W Reflex To Free T4 [E]  - Lipid Panel [E]    2. Anxiety  - dx'd 4-5 yrs ago, was previously on Fluoxetine 30 mg, but she weaned off in 1/2024, is currently stable and doing well without medication  - in future she would like to establish with counseling and wean off medication (after nursing school 5/2024)  - f-u in 6 months, or sooner prn    3. Obsessive-compulsive disorder, unspecified type  See above.    4. Screening for ischemic heart disease    - Lipid Panel [E]; Future  - Lipid Panel [E]    5. Screening for endocrine, nutritional, metabolic and immunity disorder    - CBC W Differential W Platelet [E]; Future  - Comp Metabolic Panel (14) [E]; Future  - TSH W Reflex To Free T4 [E]; Future  - CBC W Differential W Platelet [E]  - Comp Metabolic Panel (14) [E]  - TSH W Reflex To Free T4 [E]    6. Screening for chlamydial disease    - Chlamydia/Gc Amplification [E]; Future  - Chlamydia/Gc Amplification [E]    7. Need for vaccination    - TETANUS, DIPHTHERIA TOXOIDS AND ACELLULAR PERTUSIS VACCINE (TDAP), >7 YEARS, IM USE        Pt's was recommended low fat diet and aerobic exercise 30 minutes three times weekly.   Health maintenance.   Osteoporosis prevention addressed  Recommended whole food type diet, eliminate processed food/low sugar and  low sat fat diet      The patient indicates understanding of these issues and agrees to the plan.    Return in about 1 year (around 4/3/2025) for annual physical.

## 2024-04-13 ENCOUNTER — PATIENT MESSAGE (OUTPATIENT)
Dept: FAMILY MEDICINE CLINIC | Facility: CLINIC | Age: 24
End: 2024-04-13

## 2024-04-13 DIAGNOSIS — F41.9 ANXIETY: ICD-10-CM

## 2024-04-13 DIAGNOSIS — F42.9 OBSESSIVE-COMPULSIVE DISORDER, UNSPECIFIED TYPE: ICD-10-CM

## 2024-04-15 ENCOUNTER — OFFICE VISIT (OUTPATIENT)
Dept: INTERNAL MEDICINE CLINIC | Facility: CLINIC | Age: 24
End: 2024-04-15
Payer: COMMERCIAL

## 2024-04-15 VITALS
SYSTOLIC BLOOD PRESSURE: 110 MMHG | RESPIRATION RATE: 16 BRPM | HEART RATE: 96 BPM | WEIGHT: 241 LBS | DIASTOLIC BLOOD PRESSURE: 78 MMHG | BODY MASS INDEX: 34.12 KG/M2 | HEIGHT: 70.5 IN

## 2024-04-15 DIAGNOSIS — E66.9 OBESITY (BMI 30-39.9): ICD-10-CM

## 2024-04-15 DIAGNOSIS — Z51.81 THERAPEUTIC DRUG MONITORING: Primary | ICD-10-CM

## 2024-04-15 DIAGNOSIS — G47.26 SHIFT WORK SLEEP DISORDER: ICD-10-CM

## 2024-04-15 DIAGNOSIS — F41.9 ANXIETY: ICD-10-CM

## 2024-04-15 PROCEDURE — 3078F DIAST BP <80 MM HG: CPT | Performed by: NURSE PRACTITIONER

## 2024-04-15 PROCEDURE — 3074F SYST BP LT 130 MM HG: CPT | Performed by: NURSE PRACTITIONER

## 2024-04-15 PROCEDURE — 3008F BODY MASS INDEX DOCD: CPT | Performed by: NURSE PRACTITIONER

## 2024-04-15 PROCEDURE — 99214 OFFICE O/P EST MOD 30 MIN: CPT | Performed by: NURSE PRACTITIONER

## 2024-04-15 RX ORDER — SEMAGLUTIDE 0.25 MG/.5ML
0.25 INJECTION, SOLUTION SUBCUTANEOUS WEEKLY
Qty: 2 ML | Refills: 0 | Status: SHIPPED | OUTPATIENT
Start: 2024-04-15 | End: 2024-05-07

## 2024-04-15 RX ORDER — SEMAGLUTIDE 0.25 MG/.5ML
0.25 INJECTION, SOLUTION SUBCUTANEOUS WEEKLY
Qty: 2 ML | Refills: 0 | Status: SHIPPED | OUTPATIENT
Start: 2024-04-15 | End: 2024-04-15

## 2024-04-15 NOTE — PATIENT INSTRUCTIONS
Next steps:  1.  Fill your prescribed medication and take as discussed and prescribed: wegovy 0.25mg weekly x 4 weeks and then send in Narrable message if you want to stay at the same dose or increase   2.  Schedule a personal nutrition consultation with one of our registered dieticians     Please try to work on the following dietary changes:  Daily protein recommendation to start:  grams  Daily carbohydrate: <150g  Daily calories: 1,800-1,900  1.  Drink water with meals and throughout the day, cut down on soda and/or juice if consumed. Consider flavored water options like Bubbly, Spindrift, Hint and Zheng.  2.  Eat breakfast daily and focus on having protein with each meal, examples include: greek yogurt, cottage cheese, hard boiled egg, whole grain toast with peanut butter.   3.  Reduce refined carbohydrates and sugars which includes items such as sweets, as well as rice, pasta, and bread and make sure to choose whole grain options when having them with just 1 serving per meal about the size of your inner palm.  4.  Consume non starchy veggies daily working towards making them a good 50% of your daily food intake. Add them to lunch and dinner consistently.  5.  Start a daily probiotic: VSL#3 is recommended, (order on line at www.vsl3.com). Take 1 capsule daily with water for 30 days, then reduce to 1 every other day (this will reduce the cost). Capsules can be left out for 2 weeks, but then must be refrigerated.      Please download allison My Fitness Pal, LoseIt! Or Net Diary to monitor daily dietary intake and you will be able to see if you are eating the right amount of calories or too much or too little which would hinder weight loss. Additionally this will help to see your daily carbohydrate and protein intake. When you set the allison up choose 1-2 lbs/week as a goal.  Keeping a paper food journal is an option as well to remain accountable for your choices- this is the start to mindful eating! A low calorie  diet has been consistently shown to support weight loss.     Continue or start exercising to help establish a routine. If not already exercising begin with 1 day and progress as able with long-term goal of 30 minutes 5 days a week at a minimum.     Meditation daily can help manage and control stress. Chronic stress can make weight loss difficult.  Exercising is one way to help with stress, but meditation using the CALM Radha or another comparable alternative can be done in your home or place of work with little time commitment. This Radha can also help work on behavior change and improve sleep. Check out the segment under Calm Masterclass and listen to The 4 Pillars of Health. A great way to begin learning about the foundation of lifestyle with practical tips to use in your every day.     Check out www.yourweightmatters.org blog for continued daily support and education along this weight loss journey!    Patient Resources:     Personal Training/Fitness Classes/Health Coaching     [facility] @ [email or web address] Full fitness center with group fitness and personal training. Discount available as client of [employer] [facility].  Health Coaching and Personal Training with Arianne Katz at our [facility]- individual weekly coaching with option to add personal training and small group fitness classes targeted at weight loss- [phone] and/or email @ [email or web address]  [employer] [place] [email or web address] Group Fitness [phone] and/or email Zuly at [email or web address]  [facility] Fitness Centers with multiple locations: Devario (www.Zep Solar), Eat The Frog Fitness (www.Cook Taste Eat.ORVIBO), Fit Body Bootcamp (www.Practice Management e-ToolsbodybootSatorisp.ORVIBO), Infinit Fitness (www.Telller.ORVIBO), The Exercise  (www.exercisecoach.ORVIBO)     Online Fitness  Fitness  on Utube  Fit in 10  DVD series- www.clpnl50FRE.com  Sit and Be Fit - Chair exercise series Www.sitandbefit.org  Hip Hop Fit with Kirit Forbes at www.hiphopfit.net     Apps for on the Go Fitness  [place] 7 Minute Workout (orange box with white 7) - free on the go HIIT training radha  Peloton Radha @ www.onepeloton.com     Nutrition Trackers and Tools  LoseIT! And My Fitness Pal apps and on line for tracking nutrition  NOOM - virtual health coaching  FitFoundation (healthy meals on the go) in [place] @ www.nfjcyhaqiuexj2q.[employer] Energy  Bistro MD @ wwwGlobal Pari-Mutuel Servicesbistromd.com and Rltzex34 (keto and low carb plans recommended) @ wwwGlobal Pari-Mutuel Servicesjgomnf84.com, Metabolic Meals @ www.Angoss SoftwareMetabolicMeals.com - individual prepared meals to go  Gobble, Blue Apron, Home , Every Plate, Sunbasket- on line meal delivery programs for preparation at home  Meal Village in [place] for homemade meals to go @ www.mealMicroarraysllage.[employer] Farms Energy  Diet Doctor @ www.dietdoctor.com - low carb swaps  Yummly - meal prep and planning radha (www.yummly.com)     Stress Management/Behavior/Mindful Eating  CALM meditation radha (www.calm.com)  Headspace  Am I Hungry? Mindful eating virtual  radha  Www.yourweightmatters.org - Obesity Action Coalition sponsored Blog posts daily  Motivation radha (black box with white \")- daily supportive messages sent to your phone     Books/Video Education/Podcasts  Mindless Eating by Kuldeep Schaefer  Why We Get Sick by Kenny Che (a book about insulin resistance)  Atomic Habits by Shay Spencer (a book about taking small steps to promote greater behavior change)   Can't Hurt Me by Endy Zafar (a book exploring the power of discipline in achieving your goals)  The End of Dieting: How to Live for Life by Dr. Chapin Pham M.D. or listen to The PushCoin Podcast Episode 63: Understanding \"Nutritarian\" Eating w/Dr. Chapin Pham  Your Body in Balance: The New Science of Food, Hormones, and Health by Dr. Billy Person  The Menopause Diet Plan by Naima Lopez and Leana Valles  The  Complete Guide to fasting by Dr. Coulter  Sugar, Salt & Fat by Violette Cameron, Ph.D, R.D.  Weight Loss Surgery Will Not Treat Food Addiction by Elli Rizvi Ph.D  The Game Changers- Netflix Documentary on plant based nutrition  Fed Up - documentary about obesity (Free on Utube)  The Truth About Sugar - documentary on sugar (Free on Utube, https://youtu.be/4W1tadoFK3h)  The Dr. Leo T5 Wellness Plan by Dr. Long Leo MD  Fitlosophy Fitspiration - journal to better health (found at [employer] in fitness aisle)  What Happened to You?- a look at the impact trauma has on behavior written by Vanessa Whipple and Dr. Chuck Young  Whole Again by Deniz Lau - discovering your true self after trauma  Tom Dean talk on Netflix, The Call to Courage  Podcasts: The Exam Room by the Physician's Committee, Nutrition Facts by Dr. Walters    We are here to support you with weight loss, but please remember that you still need your primary care provider for your routine health maintenance.

## 2024-04-15 NOTE — PROGRESS NOTES
HISTORY OF PRESENT ILLNESS  Chief Complaint   Patient presents with    Weight Check     +21 and last seen on 08/02/2023     Rossy Boland is a 23 year old female here for follow up with medical weight loss program for the treatment of overweight, obesity, or morbid obesity.     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 not tracking reports  Meals out per week on average: 4  Stress is manageable   Sleep: 9 hours/night, waking up feeling rested    Denies chest pain, shortness of breath, dizziness, blurred vision, headache, paresthesia, nausea/vomiting.     Breakfast Lunch Dinner Snacks Fluids   Reviewed              Wt Readings from Last 6 Encounters:   04/15/24 241 lb (109.3 kg)   04/03/24 240 lb 12.8 oz (109.2 kg)   03/18/24 242 lb (109.8 kg)   08/02/23 220 lb (99.8 kg)   05/22/23 216 lb (98 kg)   03/13/23 217 lb 9.6 oz (98.7 kg)          REVIEW OF SYSTEMS  GENERAL: feels well otherwise, denied any fevers chills or night sweats   LUNGS: denies shortness of breath  CARDIOVASCULAR: denies chest pain  GI: denies abdominal pain  MUSCULOSKELETAL: denies back pain, joint pains   PSYCH: denies change in behavior or mood, denies feeling sad or depressed    EXAM  /78   Pulse 96   Resp 16   Ht 5' 10.5\" (1.791 m)   Wt 241 lb (109.3 kg)   LMP 04/14/2024 (Exact Date)   BMI 34.09 kg/m²       GENERAL: well developed, well nourished, in no apparent distress, A/O x3  SKIN: no rashes, no suspicious lesions  HEENT: atraumatic, normocephalic, OP-clear, PERRLA  NECK:  supple, no adenopathy  LUNGS: CTA in all fields, breathing non labored  CARDIO: RRR without murmur  GI: +BS, NT/ND, no masses or HSM  EXTREMITIES: no cyanosis, no clubbing, no edema    Lab Results   Component Value Date    GLU 89 04/03/2024    BUN 10 04/03/2024    BUNCREA NOT APPLICABLE 03/15/2023    CREATSERUM 0.96 04/03/2024    ANIONGAP 6 04/03/2024    CA 9.0 04/03/2024    OSMOCALC 291 04/03/2024    ALKPHO 45 (L) 04/03/2024    AST 73 (H) 04/03/2024    ALT 74 (H) 04/03/2024    BILT 0.4 04/03/2024    TP 7.0 04/03/2024    ALB 3.6 04/03/2024    GLOBULIN 3.4 04/03/2024    AGRATIO 2.0 03/15/2023     04/03/2024    K 3.9 04/03/2024     04/03/2024    CO2 24.0 04/03/2024     No results found for: \"EAG\", \"A1C\"  Lab Results   Component Value Date    CHOLEST 189 04/03/2024    TRIG 58 04/03/2024    HDL 62 (H) 04/03/2024     (H) 04/03/2024    VLDL 10 04/03/2024    TCHDLRATIO 2.7 03/15/2023    NONHDLC 127 04/03/2024     Lab Results   Component Value Date    VITB12 351 02/28/2024     Lab Results   Component Value Date    VITD 31 02/28/2024       Current Outpatient Medications on File Prior to Visit   Medication Sig Dispense Refill    cyanocobalamin 500 MCG Oral Tab Take 1 tablet (500 mcg total) by mouth daily.      Cholecalciferol (VITAMIN D) 50 MCG (2000 UT) Oral Cap Take by mouth daily.      ELURYNG 0.12-0.015 MG/24HR Vaginal Ring        No current facility-administered medications on file prior to visit.       ASSESSMENT/PLAN    ICD-10-CM    1. Therapeutic drug monitoring  Z51.81       2. Obesity (BMI 30-39.9)  E66.9       3. Anxiety  F41.9           PLAN   Initial Weight Data and Goal Weight Loss:  Initial consult: #216 lbs on 5/2023  Weight Calculations  Initial Weight: 216 lbs  Initial Weight Date: 05/01/23  Today's Weight: 241 lbs  5% Goal: 10.8  10% Goal: 21.6  Total Weight Loss: -25 lbs  Total weight loss: up #21 lbs total, Net loss +25 lbs  Will trial wegovy 0.25mg weekly X 4 weeks (sample, demo given  and 0.5mg sent to pharm)  denies any personal or family history of pancreatitis, pancreatic cancer, thyroid cancer, MEN2   --advised of side effects and adverse effects of this medication  Contradictions: has EKG pended   Reviewed labs  Will continue with vitamin d and b12 OTC  Anxiety, stable   Discussed shift-work sleep disorder, stable   Wrote out macros and encouraged to track food   Nutrition: Low carb diet, recommended to eat breakfast daily/ regular protein intake  Follow up with dietitian and psychologist as recommended.  Discussed the role of sleep and stress in weight management.  Counseled on comprehensive weight loss plan including attention to nutrition, exercise and behavior/stress management for success. See patient instruction below for more details.  Discussed strategies to overcome barriers to successful weight loss and weight maintenance  FITTE: ACSM recommendations (150-300 minutes/ week in active weight loss)   Weight Loss Consent to treat reviewed and signed.    Total time spent on chart review, pre-charting, obtaining history, counseling, and educating, reviewing labs was 30 minutes.       NOTE TO PATIENT: The 21st Century Cures Act makes clinical notes like these available to patients in the interest of transparency. Clinical notes are medical documents used by physicians and care providers to communicate with each other. These documents include medical language and terminology, abbreviations, and treatment information that may sound technical and at times possibly unfamiliar. In addition, at times, the verbiage may appear blunt or direct. These documents are one tool providers use to communicate relevant information and clinical opinions of the care providers in a way that allows common understanding of the clinical context.     There are no Patient Instructions on file for this visit.    No follow-ups on file.    Patient verbalizes understanding.    Kelly Nice, APRN

## 2024-04-16 RX ORDER — FLUOXETINE 10 MG/1
10 CAPSULE ORAL DAILY
Qty: 90 CAPSULE | Refills: 1 | Status: SHIPPED | OUTPATIENT
Start: 2024-04-16

## 2024-04-16 RX ORDER — FLUOXETINE HYDROCHLORIDE 20 MG/1
20 CAPSULE ORAL DAILY
Qty: 90 CAPSULE | Refills: 1 | Status: SHIPPED | OUTPATIENT
Start: 2024-04-16

## 2024-04-16 NOTE — TELEPHONE ENCOUNTER
Per 4/3/2024 OV notes  2. 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    Routed to provider for review and advice.

## 2024-04-16 NOTE — TELEPHONE ENCOUNTER
From: Rossy Boland  To: Lisa Wylie  Sent: 4/13/2024 6:21 AM CDT  Subject: Anxiety Medication    Hello,   I have noticed an increase in my anxiety to the point where I think I need to go back on my anxiety medication (Fluoxetine). I still have a full container of 20mg at home and was wondering if I could start up on that for now. Thank you!   -Tasha Boland

## 2024-04-17 ENCOUNTER — TELEPHONE (OUTPATIENT)
Dept: INTERNAL MEDICINE CLINIC | Facility: CLINIC | Age: 24
End: 2024-04-17

## 2024-04-17 NOTE — TELEPHONE ENCOUNTER
Bentley from [facility] called to state PA is needed for wegovy.  Cannot be done in epic  I tried to find in CMM and is not there. I called the # left by [facility] and they want us to use CMM  We have no insurance card for patient.

## 2024-04-17 NOTE — TELEPHONE ENCOUNTER
Pa started in CMM  Rossy Boland (Mcdonald: [id number])  Wegovy 0.25MG/0.5ML auto-injectors      Entered today in M  Awaiting decision

## 2024-04-30 ENCOUNTER — PATIENT MESSAGE (OUTPATIENT)
Dept: INTERNAL MEDICINE CLINIC | Facility: CLINIC | Age: 24
End: 2024-04-30

## 2024-04-30 DIAGNOSIS — E66.9 OBESITY (BMI 30-39.9): Primary | ICD-10-CM

## 2024-05-01 NOTE — TELEPHONE ENCOUNTER
Requesting   Wegovy increase     LOV: 4/15/24  RTC: 3 months  Filled: 4/15/24 #2 with 0 refills    Future Appointments   Date Time Provider Department Center   7/28/2026 11:20 AM Kelly Nice APRN [facility]

## 2024-05-01 NOTE — TELEPHONE ENCOUNTER
From: Rossy Boland  To: Kelly Nice  Sent: 4/30/2024 3:38 PM CDT  Subject: Increasing Dosage    Hello,  I wanted to send you a message to let you know I have had almost no side effects so far since starting the Wegovy 0.25mg dosage and I would like to increase my dosage. Thank you!     - Tasha Boland

## 2024-06-06 ENCOUNTER — PATIENT MESSAGE (OUTPATIENT)
Dept: INTERNAL MEDICINE CLINIC | Facility: CLINIC | Age: 24
End: 2024-06-06

## 2024-06-06 DIAGNOSIS — E66.9 OBESITY (BMI 30-39.9): Primary | ICD-10-CM

## 2024-06-07 NOTE — TELEPHONE ENCOUNTER
From: Rossy Boland  To: Kelly Nice  Sent: 6/6/2024 10:48 PM CDT  Subject: Increasing Dosage     Hello,  I wanted to send you a message to let you know I have had almost no side effects so far since starting the Wegovy 0.5mg dosage and I would like to increase my dosage. Thank you!     - Tasha Boland

## 2024-07-09 ENCOUNTER — PATIENT MESSAGE (OUTPATIENT)
Dept: INTERNAL MEDICINE CLINIC | Facility: CLINIC | Age: 24
End: 2024-07-09

## 2024-07-09 DIAGNOSIS — E66.9 OBESITY (BMI 30-39.9): Primary | ICD-10-CM

## 2024-07-09 NOTE — TELEPHONE ENCOUNTER
From: Rossy Boland  To: Kelly Nice  Sent: 7/9/2024 1:35 PM CDT  Subject: Increasing Dosage     Hello,  I wanted to send you a message to let you know I have had almost no side effects so far since starting the Wegovy 1.0mg dosage and I would like to increase my dosage. Thank you!      - Tasha Boland

## 2024-07-09 NOTE — TELEPHONE ENCOUNTER
Requesting wegovy increase  LOV: 4/15/24  RTC: 3 months  Last Relevant Labs: na  Filled: 6/7/24 #2ml with 1 refills  1 mg dose    Future Appointments   Date Time Provider Department Center   7/28/2026 11:20 AM Kelly Nice APRN [facility]

## 2024-07-11 DIAGNOSIS — E66.9 OBESITY (BMI 30-39.9): ICD-10-CM

## 2024-07-30 ENCOUNTER — TELEPHONE (OUTPATIENT)
Dept: INTERNAL MEDICINE CLINIC | Facility: CLINIC | Age: 24
End: 2024-07-30

## 2024-07-30 NOTE — TELEPHONE ENCOUNTER
Patient was accidentally scheduled for 7/28/2026 instead of this year so she LVM to get it rescheduled. I called back and rescheduled her for Aug 21st.    Future Appointments   Date Time Provider Department Center   8/21/2024  1:20 PM Kelly Nice APRN [facility]        yes

## 2024-08-04 DIAGNOSIS — E66.9 OBESITY (BMI 30-39.9): ICD-10-CM

## 2024-08-20 ENCOUNTER — NURSE ONLY (OUTPATIENT)
Dept: FAMILY MEDICINE CLINIC | Facility: CLINIC | Age: 24
End: 2024-08-20
Payer: COMMERCIAL

## 2024-08-20 DIAGNOSIS — E78.2 MIXED HYPERLIPIDEMIA: ICD-10-CM

## 2024-08-20 DIAGNOSIS — R79.89 ELEVATED LFTS: ICD-10-CM

## 2024-08-20 LAB
ALBUMIN SERPL-MCNC: 4.6 G/DL (ref 3.2–4.8)
ALBUMIN/GLOB SERPL: 1.8 {RATIO} (ref 1–2)
ALP LIVER SERPL-CCNC: 50 U/L
ALT SERPL-CCNC: <7 U/L
ANION GAP SERPL CALC-SCNC: 9 MMOL/L (ref 0–18)
AST SERPL-CCNC: 12 U/L (ref ?–34)
BILIRUB SERPL-MCNC: 0.4 MG/DL (ref 0.3–1.2)
BUN BLD-MCNC: 9 MG/DL (ref 9–23)
CALCIUM BLD-MCNC: 9.6 MG/DL (ref 8.7–10.4)
CHLORIDE SERPL-SCNC: 108 MMOL/L (ref 98–112)
CHOLEST SERPL-MCNC: 180 MG/DL (ref ?–200)
CO2 SERPL-SCNC: 22 MMOL/L (ref 21–32)
CREAT BLD-MCNC: 0.92 MG/DL
EGFRCR SERPLBLD CKD-EPI 2021: 90 ML/MIN/1.73M2 (ref 60–?)
FASTING PATIENT LIPID ANSWER: YES
FASTING STATUS PATIENT QL REPORTED: YES
GLOBULIN PLAS-MCNC: 2.6 G/DL (ref 2–3.5)
GLUCOSE BLD-MCNC: 88 MG/DL (ref 70–99)
HDLC SERPL-MCNC: 45 MG/DL (ref 40–59)
LDLC SERPL CALC-MCNC: 118 MG/DL (ref ?–100)
NONHDLC SERPL-MCNC: 135 MG/DL (ref ?–130)
OSMOLALITY SERPL CALC.SUM OF ELEC: 286 MOSM/KG (ref 275–295)
POTASSIUM SERPL-SCNC: 4.2 MMOL/L (ref 3.5–5.1)
PROT SERPL-MCNC: 7.2 G/DL (ref 5.7–8.2)
SODIUM SERPL-SCNC: 139 MMOL/L (ref 136–145)
TRIGL SERPL-MCNC: 94 MG/DL (ref 30–149)
VLDLC SERPL CALC-MCNC: 16 MG/DL (ref 0–30)

## 2024-08-20 PROCEDURE — 80053 COMPREHEN METABOLIC PANEL: CPT | Performed by: FAMILY MEDICINE

## 2024-08-20 PROCEDURE — 80061 LIPID PANEL: CPT | Performed by: FAMILY MEDICINE

## 2024-08-20 NOTE — PROGRESS NOTES
Patient came in for draw of ordered fasting labs. Patient drawn out of right arm AC, x 2 attempt and tolerated well.  1 SST ( green)  tube drawn.

## 2024-08-21 ENCOUNTER — OFFICE VISIT (OUTPATIENT)
Dept: INTERNAL MEDICINE CLINIC | Facility: CLINIC | Age: 24
End: 2024-08-21
Payer: COMMERCIAL

## 2024-08-21 VITALS
WEIGHT: 209 LBS | DIASTOLIC BLOOD PRESSURE: 62 MMHG | HEIGHT: 70.5 IN | RESPIRATION RATE: 16 BRPM | SYSTOLIC BLOOD PRESSURE: 102 MMHG | BODY MASS INDEX: 29.59 KG/M2 | HEART RATE: 96 BPM

## 2024-08-21 DIAGNOSIS — E66.9 OBESITY (BMI 30-39.9): ICD-10-CM

## 2024-08-21 DIAGNOSIS — F41.9 ANXIETY: ICD-10-CM

## 2024-08-21 DIAGNOSIS — G47.26 SHIFT WORK SLEEP DISORDER: ICD-10-CM

## 2024-08-21 DIAGNOSIS — Z51.81 THERAPEUTIC DRUG MONITORING: Primary | ICD-10-CM

## 2024-08-21 PROCEDURE — 3008F BODY MASS INDEX DOCD: CPT | Performed by: NURSE PRACTITIONER

## 2024-08-21 PROCEDURE — 99214 OFFICE O/P EST MOD 30 MIN: CPT | Performed by: NURSE PRACTITIONER

## 2024-08-21 PROCEDURE — 3078F DIAST BP <80 MM HG: CPT | Performed by: NURSE PRACTITIONER

## 2024-08-21 PROCEDURE — 3074F SYST BP LT 130 MM HG: CPT | Performed by: NURSE PRACTITIONER

## 2024-08-21 NOTE — PROGRESS NOTES
HISTORY OF PRESENT ILLNESS  Chief Complaint   Patient presents with    Weight Check     -32     Rossy Boland is a 23 year old female here for follow up with medical weight loss program for the treatment of overweight, obesity, or morbid obesity.     Down 32 lbs  Compliant on wegovy 1.7mg weekly   Tolerating well, helping with decreasing appetite and no side effects     Success: consistency in the gym  Challenging: meal prep   Smaller portion sizes   Has been focusing on mental health and sleep   Going to the gym- feeling stronger   Is not weighing herself   Will be moving to [place] in the next couple of months (but will be coming back and forth to [place])  Exercise/Activity: 5x/ week, via walking (20-30min), golfing (1 day per week), weight lifting at the gym 4-5 days per weeks   Nutrition: eating regular meals, +protein, minimal veggies. not tracking reports  Meals out per week on average: 2  Stress is manageable   Sleep: 9 hours/night, waking up feeling rested    Denies chest pain, shortness of breath, dizziness, blurred vision, headache, paresthesia, nausea/vomiting.     Breakfast Lunch Dinner Snacks Fluids   Reviewed              Wt Readings from Last 6 Encounters:   08/21/24 209 lb (94.8 kg)   04/15/24 241 lb (109.3 kg)   04/03/24 240 lb 12.8 oz (109.2 kg)   03/18/24 242 lb (109.8 kg)   08/02/23 220 lb (99.8 kg)   05/22/23 216 lb (98 kg)          REVIEW OF SYSTEMS  GENERAL: feels well otherwise, denied any fevers chills or night sweats   LUNGS: denies shortness of breath  CARDIOVASCULAR: denies chest pain  GI: denies abdominal pain  MUSCULOSKELETAL: denies back pain, joint pains   PSYCH: denies change in behavior or mood, denies feeling sad or depressed    EXAM  /62   Pulse 96   Resp 16   Ht 5' 10.5\" (1.791 m)   Wt 209 lb (94.8 kg)   LMP 07/22/2024 (Exact Date)   BMI 29.56 kg/m²       GENERAL: well developed, well nourished, in no apparent distress, A/O x3  SKIN: no rashes, no suspicious  lesions  HEENT: atraumatic, normocephalic, OP-clear, PERRLA  NECK: supple, no adenopathy  LUNGS: CTA in all fields, breathing non labored  CARDIO: RRR without murmur  GI: +BS, NT/ND, no masses or HSM  EXTREMITIES: no cyanosis, no clubbing, no edema    Lab Results   Component Value Date    GLU 88 08/20/2024    BUN 9 08/20/2024    BUNCREA NOT APPLICABLE 03/15/2023    CREATSERUM 0.92 08/20/2024    ANIONGAP 9 08/20/2024    CA 9.6 08/20/2024    OSMOCALC 286 08/20/2024    ALKPHO 50 (L) 08/20/2024    AST 12 08/20/2024    ALT <7 (L) 08/20/2024    BILT 0.4 08/20/2024    TP 7.2 08/20/2024    ALB 4.6 08/20/2024    GLOBULIN 2.6 08/20/2024    AGRATIO 2.0 03/15/2023     08/20/2024    K 4.2 08/20/2024     08/20/2024    CO2 22.0 08/20/2024     No results found for: \"EAG\", \"A1C\"  Lab Results   Component Value Date    CHOLEST 180 08/20/2024    TRIG 94 08/20/2024    HDL 45 08/20/2024     (H) 08/20/2024    VLDL 16 08/20/2024    TCHDLRATIO 2.7 03/15/2023    NONHDLC 135 (H) 08/20/2024     Lab Results   Component Value Date    VITB12 351 02/28/2024     Lab Results   Component Value Date    VITD 31 02/28/2024       Current Outpatient Medications on File Prior to Visit   Medication Sig Dispense Refill    semaglutide-weight management 1.7 MG/0.75ML Subcutaneous Solution Auto-injector Inject 0.75 mL (1.7 mg total) into the skin once a week. 3 mL 1    FLUoxetine 10 MG Oral Cap Take 1 capsule (10 mg total) by mouth daily. 90 capsule 1    FLUoxetine 20 MG Oral Cap Take 1 capsule (20 mg total) by mouth daily. 90 capsule 1    cyanocobalamin 500 MCG Oral Tab Take 1 tablet (500 mcg total) by mouth daily.      Cholecalciferol (VITAMIN D) 50 MCG (2000 UT) Oral Cap Take by mouth daily.      ELURYNG 0.12-0.015 MG/24HR Vaginal Ring        No current facility-administered medications on file prior to visit.       ASSESSMENT/PLAN    ICD-10-CM    1. Therapeutic drug monitoring  Z51.81       2. Obesity (BMI 30-39.9)  E66.9       3. Anxiety   F41.9       4. 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           PLAN   Initial Weight Data and Goal Weight Loss:  Initial consult: #216 lbs on 5/2023  Weight Calculations  Initial Weight: 216 lbs  Initial Weight Date: 05/01/23  Today's Weight: 209 lbs  5% Goal: 10.8  10% Goal: 21.6  Total Weight Loss: 7 lbs  Total weight loss: Down 32 lbs total, Net loss 7 lbs  Continue with medications: wegovy 1.7mg weekly (will stay at this dosage)   --advised of side effects and adverse effects of this medication  Contradictions: none  Reviewed labs  Will continue with vitamin d and b12 OTC  Anxiety, stable   Discussed shift-work sleep disorder, stable   Wrote out macros and encouraged to track food   Nutrition: Low carb diet, recommended to eat breakfast daily/ regular protein intake  Follow up with dietitian and psychologist as recommended.  Discussed the role of sleep and stress in weight management.  Counseled on comprehensive weight loss plan including attention to nutrition, exercise and behavior/stress management for success. See patient instruction below for more details.  Discussed strategies to overcome barriers to successful weight loss and weight maintenance  FITTE: ACSM recommendations (150-300 minutes/ week in active weight loss)   Weight Loss Consent to treat reviewed and signed.    Total time spent on chart review, pre-charting, obtaining history, counseling, and educating, reviewing labs was 30 minutes.       NOTE TO PATIENT: The 21st Century Cures Act makes clinical notes like these available to patients in the interest of transparency. Clinical notes are medical documents used by physicians and care providers to communicate with each other. These documents include medical language and terminology, abbreviations, and treatment information that may sound technical and at times possibly unfamiliar. In addition, at times, the verbiage may appear blunt or direct. These documents are one tool providers use to communicate  relevant information and clinical opinions of the care providers in a way that allows common understanding of the clinical context.     There are no Patient Instructions on file for this visit.    No follow-ups on file.    Patient verbalizes understanding.    Kelly Nice, APRN

## 2024-08-21 NOTE — PATIENT INSTRUCTIONS
Next steps:  1.  Fill your prescribed medication and take as discussed and prescribed: wegovy 1.7mg weekly   2.  Schedule a personal nutrition consultation with one of our registered dieticians     Please try to work on the following dietary changes:  Daily protein recommendation to start:  grams  Daily carbohydrate: <140g  Daily calories: 1,600-1,700  1.  Drink water with meals and throughout the day, cut down on soda and/or juice if consumed. Consider flavored water options like Bubbly, Spindrift, Hint and Zheng.  2.  Eat breakfast daily and focus on having protein with each meal, examples include: greek yogurt, cottage cheese, hard boiled egg, whole grain toast with peanut butter.   3.  Reduce refined carbohydrates and sugars which includes items such as sweets, as well as rice, pasta, and bread and make sure to choose whole grain options when having them with just 1 serving per meal about the size of your inner palm.  4.  Consume non starchy veggies daily working towards making them a good 50% of your daily food intake. Add them to lunch and dinner consistently.  5.  Start a daily probiotic: VSL#3 is recommended, (order on line at www.vsl3.com). Take 1 capsule daily with water for 30 days, then reduce to 1 every other day (this will reduce the cost). Capsules can be left out for 2 weeks, but then must be refrigerated.      Please download allison My Fitness Pal, LoseIt! Or Net Diary to monitor daily dietary intake and you will be able to see if you are eating the right amount of calories or too much or too little which would hinder weight loss. Additionally this will help to see your daily carbohydrate and protein intake. When you set the allison up choose 1-2 lbs/week as a goal.  Keeping a paper food journal is an option as well to remain accountable for your choices- this is the start to mindful eating! A low calorie diet has been consistently shown to support weight loss.     Continue or start exercising to  help establish a routine. If not already exercising begin with 1 day and progress as able with long-term goal of 30 minutes 5 days a week at a minimum.     Meditation daily can help manage and control stress. Chronic stress can make weight loss difficult.  Exercising is one way to help with stress, but meditation using the CALM Radha or another comparable alternative can be done in your home or place of work with little time commitment. This Radha can also help work on behavior change and improve sleep. Check out the segment under Calm Masterclass and listen to The 4 Pillars of Health. A great way to begin learning about the foundation of lifestyle with practical tips to use in your every day.     Check out www.yourweightmatters.org blog for continued daily support and education along this weight loss journey!    Patient Resources:     Personal Training/Fitness Classes/Health Coaching     [facility] @ [email or web address] Full fitness center with group fitness and personal training. Discount available as client of [facility].  Health Coaching and Personal Training with Arianne Katz at our [facility]- individual weekly coaching with option to add personal training and small group fitness classes targeted at weight loss- [phone] and/or email @ [email or web address]  360FIT [place] [email or web address]. Group Fitness [phone] and/or email Zuly at [email or web address]  [facility] Fitness Centers with multiple locations: Marblar (www.Modacruz), Eat The Polimetrix Fitness (www.[employer] Laboratories.INCIDE), Fit Body Bootcamp (www.Guardant HealthbodyboFlapsharep.INCIDE), SlideRocket (www.SweetSlap.INCIDE), The Exercise  (www.exercisecoach.INCIDE)     Online Fitness  Fitness  on Utube  Fit in 10 DVD series- www.ffiso65MMO.com  Sit and Be Fit - Chair exercise series Www.sitandbefit.org  Hip  Hop Fit with Kirit Forbes at www.hiphopfit.net     Apps for on the Go Fitness  [place] 7 Minute Workout (orange box with white 7) - free on the go HIIT training radha  Peloton Radha @ www.onepeloton.com     Nutrition Trackers and Tools  LoseIT! And My Fitness Pal apps and on line for tracking nutrition  NOOM - virtual health coaching  FitFoundation (healthy meals on the go) in [place] @ www.[email or web address].[employer]  Bistro MD @ Fusion-io.bistromd.com and Eocsat01 (keto and low carb plans recommended) @ www.qxttdx41.com, Metabolic Meals @ www.MyMetabolicMeals.com - individual prepared meals to go  Gobble, Blue Apron, Home , Every Plate, Sunbasket- on line meal delivery programs for preparation at home  Meal Village in [place] for homemade meals to go @ www.mealvillage.[employer]  Diet Doctor @ www.dietdoctor.[employer] - low carb swaps  YuSpeakingPal - meal prep and planning radha (www.yummly.com)     Stress Management/Behavior/Mindful Eating  CALM meditation radha (www.calm.com)  Headspace  Am I Hungry? Mindful eating virtual  radha  Www.yourweightmatters.org - Obesity Action Coalition sponsored Blog posts daily  Motivation radha (black box with white \")- daily supportive messages sent to your phone     Books/Video Education/Podcasts  Mindless Eating by Kuldeep Schaefer  Why We Get Sick by Kenny Che (a book about insulin resistance)  Atomic Habits by Shay Spencer (a book about taking small steps to promote greater behavior change)   Can't Hurt Me by Endy Zafar (a book exploring the power of discipline in achieving your goals)  The End of Dieting: How to Live for Life by Dr. Chapin Pham M.D. or listen to The [employer] Podcast Episode 63: Understanding \"Nutritarian\" Eating w/Dr. Chapin Pham  Your Body in Balance: The New Science of Food, Hormones, and Health by Dr. Billy Person  The Menopause Diet Plan by Naima Lopez and Leana Valles  The Complete Guide to fasting by Dr. Coulter  Sugar, Salt & Fat by Violette Cameron, Ph.D, R.D.  Weight  Loss Surgery Will Not Treat Food Addiction by Elli Rizvi Ph.D  The Game Changers- listedplacesix Documentary on plant based nutrition  Fed Up - documentary about obesity (Free on Utube)  The Truth About Sugar - documentary on sugar (Free on Utube, https://youArpeggiu.be/2X9xlneCI0s)  The Dr. Leo T5 Wellness Plan by Dr. Long Leo MD  Fitlosophy Fitspiration - journal to better health (found at [employer] in fitness aisle)  What Happened to You?- a look at the impact trauma has on behavior written by Vanessa Whipple and Dr. Chuck Young  Whole Again by Deniz Lau - discovering your true self after trauma  Tom Dean talk on [employer], The Call to Courage  Podcasts: The Exam Room by the Physician's Committee, Nutrition Facts by Dr. Walters    We are here to support you with weight loss, but please remember that you still need your primary care provider for your routine health maintenance.

## 2024-09-06 DIAGNOSIS — E66.9 OBESITY (BMI 30-39.9): ICD-10-CM

## 2024-09-11 ENCOUNTER — PATIENT MESSAGE (OUTPATIENT)
Dept: INTERNAL MEDICINE CLINIC | Facility: CLINIC | Age: 24
End: 2024-09-11

## 2024-09-12 NOTE — TELEPHONE ENCOUNTER
From: Rossy Boland  Sent: 9/12/2024 3:09 PM CDT  To: [facility] Clinical Pool  Subject: Initial Coverage Fax Form    [phone] is the fax number that the form is to be faxed to when complete.

## 2024-09-17 ENCOUNTER — TELEPHONE (OUTPATIENT)
Dept: INTERNAL MEDICINE CLINIC | Facility: CLINIC | Age: 24
End: 2024-09-17

## 2024-09-17 NOTE — TELEPHONE ENCOUNTER
Patient LVM that she finally got her wegovy filled for the 1.7 mg dose - however - her last shot was 8/29/24 and the pharmacist told her it would not be good to resume at that dose.  Please advise.

## 2024-11-14 ENCOUNTER — PATIENT OUTREACH (OUTPATIENT)
Dept: CASE MANAGEMENT | Age: 24
End: 2024-11-14

## 2024-11-14 ENCOUNTER — OFFICE VISIT (OUTPATIENT)
Dept: FAMILY MEDICINE CLINIC | Facility: CLINIC | Age: 24
End: 2024-11-14
Payer: COMMERCIAL

## 2024-11-14 VITALS
DIASTOLIC BLOOD PRESSURE: 76 MMHG | HEIGHT: 70.5 IN | RESPIRATION RATE: 18 BRPM | TEMPERATURE: 98 F | BODY MASS INDEX: 28.91 KG/M2 | SYSTOLIC BLOOD PRESSURE: 116 MMHG | OXYGEN SATURATION: 99 % | HEART RATE: 62 BPM | WEIGHT: 204.19 LBS

## 2024-11-14 DIAGNOSIS — Z23 NEED FOR VACCINATION: ICD-10-CM

## 2024-11-14 DIAGNOSIS — K64.9 HEMORRHOIDS, UNSPECIFIED HEMORRHOID TYPE: Primary | ICD-10-CM

## 2024-11-14 PROCEDURE — 99214 OFFICE O/P EST MOD 30 MIN: CPT | Performed by: FAMILY MEDICINE

## 2024-11-14 PROCEDURE — 90656 IIV3 VACC NO PRSV 0.5 ML IM: CPT | Performed by: FAMILY MEDICINE

## 2024-11-14 PROCEDURE — 90471 IMMUNIZATION ADMIN: CPT | Performed by: FAMILY MEDICINE

## 2024-11-14 PROCEDURE — 3074F SYST BP LT 130 MM HG: CPT | Performed by: FAMILY MEDICINE

## 2024-11-14 PROCEDURE — 3008F BODY MASS INDEX DOCD: CPT | Performed by: FAMILY MEDICINE

## 2024-11-14 PROCEDURE — 3078F DIAST BP <80 MM HG: CPT | Performed by: FAMILY MEDICINE

## 2024-11-14 RX ORDER — HYDROCORTISONE 25 MG/G
1 CREAM TOPICAL 2 TIMES DAILY PRN
Qty: 28 G | Refills: 1 | Status: SHIPPED | OUTPATIENT
Start: 2024-11-14

## 2024-11-14 NOTE — PROCEDURES
The office order for PCP removal request is Approved and finalized on November 14, 2024.    Removed Lisa Wylie MD as the patient's Primary Care Physician

## 2024-11-14 NOTE — PROGRESS NOTES
CHIEF COMPLAINT:   Chief Complaint   Patient presents with    Rectal Pain     Possibly theroids x 1 week          HPI:     Rossy Boland is a 24 year old female presents for rectal pain.    Rectal pain: pt reports she was constipated in the past month. Is no longer constipated but having rectal pain.  No bleeding.  Stools are normal now.  She was straining in the past.  Of note, she is on Wegovy 1.7 mg, has been on this for awhile.  She states she drinks water.  Eats a good amount of fiber in her diet.     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  She is planning on establishing with a new PCP and Gyn out there.               HISTORY:  Past Medical History:    Anxiety      No past surgical history on file.   No family history on file.   Social History:   Social History     Socioeconomic History    Marital status: Single   Occupational History    Occupation: NICU tech   Tobacco Use    Smoking status: Never     Passive exposure: Never    Smokeless tobacco: Never   Substance and Sexual Activity    Alcohol use: Not Currently    Drug use: Never   Other Topics Concern    Caffeine Concern No    Exercise Yes    Seat Belt Yes    Special Diet No    Stress Concern No    Weight Concern Yes     Social Drivers of Health     Food Insecurity: No Food Insecurity (7/26/2022)    [facility]    Food Insecurity     Currently or in the past 3 months, have you worried your food would run out before you had money to buy more?: No     In the past 12 months, have you run out of food or been unable to get more?: No    [facility]    Housing Stability        Medications (Active prior to today's visit):  Current Outpatient Medications   Medication Sig Dispense Refill    hydrocortisone 2.5 % External Cream Place 1 Application rectally 2 (two) times daily as needed for Hemorrhoids. 28 g 1     semaglutide-weight management 1.7 MG/0.75ML Subcutaneous Solution Auto-injector Inject 0.75 mL (1.7 mg total) into the skin once a week. 9 mL 0    Cholecalciferol (VITAMIN D) 50 MCG (2000 UT) Oral Cap Take by mouth daily.      ELURYNG 0.12-0.015 MG/24HR Vaginal Ring       FLUoxetine 10 MG Oral Cap Take 1 capsule (10 mg total) by mouth daily. 90 capsule 1    FLUoxetine 20 MG Oral Cap Take 1 capsule (20 mg total) by mouth daily. 90 capsule 1    cyanocobalamin 500 MCG Oral Tab Take 1 tablet (500 mcg total) by mouth daily.         Allergies:  Allergies[1]    PSFH elements reviewed from today and agreed except as otherwise stated in HPI.  ROS:     Review of Systems   Constitutional:  Negative for appetite change and fatigue.   Gastrointestinal:  Positive for constipation and rectal pain. Negative for abdominal pain, diarrhea, nausea and vomiting.   Genitourinary:  Negative for dysuria.         Pertinent positives and negatives noted in the the HPI.    PHYSICAL EXAM:   /76 (BP Location: Left arm, Patient Position: Sitting, Cuff Size: adult)   Pulse 62   Temp 98.2 °F (36.8 °C) (Temporal)   Resp 18   Ht 5' 10.5\" (1.791 m)   Wt 204 lb 3.2 oz (92.6 kg)   LMP 07/22/2024 (Exact Date)   SpO2 99%   BMI 28.89 kg/m²   Vital signs reviewed.Appears stated age, well groomed.  Physical Exam  Vitals reviewed.   Constitutional:       Appearance: Normal appearance.   HENT:      Head: Normocephalic.   Cardiovascular:      Rate and Rhythm: Normal rate and regular rhythm.      Pulses: Normal pulses.      Heart sounds: Normal heart sounds.   Abdominal:      General: Bowel sounds are normal. There is no distension.      Palpations: Abdomen is soft.      Tenderness: There is no abdominal tenderness. There is no guarding.   Genitourinary:     Comments: Deferred rectal exam  Skin:     General: Skin is warm and dry.   Neurological:      Mental Status: She is alert and oriented to person, place, and time.          LABS     No visits  with results within 2 Month(s) from this visit.   Latest known visit with results is:   Nurse Only on 08/20/2024   Component Date Value    Cholesterol, Total 08/20/2024 180     HDL Cholesterol 08/20/2024 45     Triglycerides 08/20/2024 94     LDL Cholesterol 08/20/2024 118 (H)     VLDL 08/20/2024 16     Non HDL Chol 08/20/2024 135 (H)     Patient Fasting for Lipi* 08/20/2024 Yes     Glucose 08/20/2024 88     Sodium 08/20/2024 139     Potassium 08/20/2024 4.2     Chloride 08/20/2024 108     CO2 08/20/2024 22.0     Anion Gap 08/20/2024 9     BUN 08/20/2024 9     Creatinine 08/20/2024 0.92     Calcium, Total 08/20/2024 9.6     Calculated Osmolality 08/20/2024 286     eGFR-Cr 08/20/2024 90     AST 08/20/2024 12     ALT 08/20/2024 <7 (L)     Alkaline Phosphatase 08/20/2024 50 (L)     Bilirubin, Total 08/20/2024 0.4     Total Protein 08/20/2024 7.2     Albumin 08/20/2024 4.6     Globulin  08/20/2024 2.6     A/G Ratio 08/20/2024 1.8     Patient Fasting for CMP? 08/20/2024 Yes       REVIEWED THIS VISIT  ASSESSMENT/PLAN:   24 year old female with    1. Hemorrhoids, unspecified hemorrhoid type  - high suspicion for hemorrhoids  - recommend Anusol prn, R/B/SE of new med d/w pt  -   - hydrocortisone 2.5 % External Cream; Place 1 Application rectally 2 (two) times daily as needed for Hemorrhoids.  Dispense: 28 g; Refill: 1    2. Need for vaccination    - INFLUENZA VACCINE, TRI, PRESERV FREE, 0.5 ML      Meds This Visit:  Requested Prescriptions     Signed Prescriptions Disp Refills    hydrocortisone 2.5 % External Cream 28 g 1     Sig: Place 1 Application rectally 2 (two) times daily as needed for Hemorrhoids.       Health Maintenance:  Health Maintenance   Topic Date Due    HPV Vaccines (1 - 3-dose series) Never done    Pap Smear  Never done    COVID-19 Vaccine (2 - 2024-25 season) 09/01/2024    Chlamydia Screening  04/03/2025    Annual Physical  04/03/2025    DTaP,Tdap,and Td Vaccines (2 - Td or Tdap) 04/03/2034    Influenza  Vaccine  Completed    Annual Depression Screening  Completed    Pneumococcal Vaccine: Birth to 64yrs  Aged Out         Patient/Caregiver Education: There are no barriers to learning. Medical education done.   Outcome: Patient verbalizes understanding and agrees with plan. Advised to call or RTC if symptoms persist or worsen.    Problem List:     Patient Active Problem List   Diagnosis    Anxiety    Gastroesophageal reflux disease    Obsessive-compulsive disorder    Seasonal allergies    Anemia       Imaging & Referrals:  INFLUENZA VACCINE, TRI, PRESERV FREE, 0.5 ML     11/14/2024  Lisa Wylie MD      Patient understands plan and follow-up.  Return if symptoms worsen or fail to improve.              [1]   Allergies  Allergen Reactions    Seasonal UNKNOWN

## 2024-12-09 ENCOUNTER — PATIENT MESSAGE (OUTPATIENT)
Dept: INTERNAL MEDICINE CLINIC | Facility: CLINIC | Age: 24
End: 2024-12-09

## 2024-12-09 DIAGNOSIS — E66.9 OBESITY (BMI 30-39.9): ICD-10-CM

## 2024-12-09 RX ORDER — SEMAGLUTIDE 1.7 MG/.75ML
INJECTION, SOLUTION SUBCUTANEOUS
Qty: 9 ML | Refills: 0 | Status: SHIPPED | OUTPATIENT
Start: 2024-12-09

## 2024-12-09 NOTE — TELEPHONE ENCOUNTER
Requesting   Requested Prescriptions     Pending Prescriptions Disp Refills    WEGOVY 1.7 MG/0.75ML Subcutaneous Solution Auto-injector [Pharmacy Med Name: WEGOVY 1.7MG/0.75ML INJ ( 4 PENS)] 9 mL 0     Sig: ADMINISTER 1.7 MG UNDER THE SKIN 1 TIME A WEEK       LOV: 08/21/2024  RTC: in about 4 months  Filled: 08/21/2024 #9ml with 0 refills    No future appointments.

## 2024-12-12 ENCOUNTER — OFFICE VISIT (OUTPATIENT)
Dept: INTERNAL MEDICINE CLINIC | Facility: CLINIC | Age: 24
End: 2024-12-12
Payer: COMMERCIAL

## 2024-12-12 VITALS
BODY MASS INDEX: 27.32 KG/M2 | HEIGHT: 70.5 IN | DIASTOLIC BLOOD PRESSURE: 78 MMHG | WEIGHT: 193 LBS | HEART RATE: 80 BPM | RESPIRATION RATE: 16 BRPM | SYSTOLIC BLOOD PRESSURE: 112 MMHG

## 2024-12-12 DIAGNOSIS — F41.9 ANXIETY: ICD-10-CM

## 2024-12-12 DIAGNOSIS — E66.9 OBESITY (BMI 30-39.9): ICD-10-CM

## 2024-12-12 DIAGNOSIS — G47.26 SHIFT WORK SLEEP DISORDER: ICD-10-CM

## 2024-12-12 DIAGNOSIS — Z51.81 THERAPEUTIC DRUG MONITORING: Primary | ICD-10-CM

## 2024-12-12 PROCEDURE — 3008F BODY MASS INDEX DOCD: CPT | Performed by: NURSE PRACTITIONER

## 2024-12-12 PROCEDURE — 3074F SYST BP LT 130 MM HG: CPT | Performed by: NURSE PRACTITIONER

## 2024-12-12 PROCEDURE — 99214 OFFICE O/P EST MOD 30 MIN: CPT | Performed by: NURSE PRACTITIONER

## 2024-12-12 PROCEDURE — 3078F DIAST BP <80 MM HG: CPT | Performed by: NURSE PRACTITIONER

## 2024-12-12 NOTE — PROGRESS NOTES
HISTORY OF PRESENT ILLNESS  Chief Complaint   Patient presents with    Weight Check     Down 16     Rossy Boland is a 24 year old female here for follow up with medical weight loss program for the treatment of overweight, obesity, or morbid obesity.     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   Found out that her insurance will stop covering injections in April 2025  Feels like she needs to get back into the gym with weight training again  Feels like the zepbound has really helped with OCD thoughts and reducing anxiety   Exercise/Activity: 3x/ week, via walking at work (12 hour shift)   Nutrition: eating regular meals, +protein, minimal veggies. not tracking reports  Meals out per week on average: 5  Stress is manageable   Sleep: 8-9 hours/night, waking up feeling rested    Denies chest pain, shortness of breath, dizziness, blurred vision, headache, paresthesia, nausea/vomiting.     Breakfast Lunch Dinner Snacks Fluids   Reviewed              Wt Readings from Last 6 Encounters:   12/12/24 193 lb (87.5 kg)   11/14/24 204 lb 3.2 oz (92.6 kg)   08/21/24 209 lb (94.8 kg)   04/15/24 241 lb (109.3 kg)   04/03/24 240 lb 12.8 oz (109.2 kg)   03/18/24 242 lb (109.8 kg)          REVIEW OF SYSTEMS  GENERAL: feels well otherwise, denied any fevers chills or night sweats   LUNGS: denies shortness of breath  CARDIOVASCULAR: denies chest pain  GI: denies abdominal pain  MUSCULOSKELETAL: denies back pain, joint pains   PSYCH: denies change in behavior or mood, denies feeling sad or depressed    EXAM  /78   Pulse 80   Resp 16   Ht 5' 10.5\" (1.791 m)   Wt 193 lb (87.5 kg)   LMP 11/23/2024 (Exact Date)   BMI 27.30 kg/m²       GENERAL: well developed, well nourished, in no apparent distress, A/O x3  SKIN: no rashes,  no suspicious lesions  HEENT: atraumatic, normocephalic, OP-clear, PERRLA  NECK: supple, no adenopathy  LUNGS: CTA in all fields, breathing non labored  CARDIO: RRR without murmur  GI: +BS, NT/ND, no masses or HSM  EXTREMITIES: no cyanosis, no clubbing, no edema    Lab Results   Component Value Date    GLU 88 08/20/2024    BUN 9 08/20/2024    BUNCREA NOT APPLICABLE 03/15/2023    CREATSERUM 0.92 08/20/2024    ANIONGAP 9 08/20/2024    CA 9.6 08/20/2024    OSMOCALC 286 08/20/2024    ALKPHO 50 (L) 08/20/2024    AST 12 08/20/2024    ALT <7 (L) 08/20/2024    BILT 0.4 08/20/2024    TP 7.2 08/20/2024    ALB 4.6 08/20/2024    GLOBULIN 2.6 08/20/2024    AGRATIO 2.0 03/15/2023     08/20/2024    K 4.2 08/20/2024     08/20/2024    CO2 22.0 08/20/2024     No results found for: \"EAG\", \"A1C\"  Lab Results   Component Value Date    CHOLEST 180 08/20/2024    TRIG 94 08/20/2024    HDL 45 08/20/2024     (H) 08/20/2024    VLDL 16 08/20/2024    TCHDLRATIO 2.7 03/15/2023    NONHDLC 135 (H) 08/20/2024     Lab Results   Component Value Date    VITB12 351 02/28/2024     Lab Results   Component Value Date    VITD 31 02/28/2024       Medications Ordered Prior to Encounter[1]    ASSESSMENT/PLAN    ICD-10-CM    1. Therapeutic drug monitoring  Z51.81       2. Obesity (BMI 30-39.9)  E66.9       3. Anxiety  F41.9       4. 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           PLAN   Initial Weight Data and Goal Weight Loss:  Initial consult: #216 lbs on 5/2023  Weight Calculations  Initial Weight: 216 lbs  Initial Weight Date: 05/01/23  Today's Weight: 193 lbs  5% Goal: 10.8  10% Goal: 21.6  Total Weight Loss: 23 lbs  Total weight loss: Down 16 lbs total, Net loss 23 lbs  Continue with medications: wegovy 1.7mg weekly (would like to stay at this dosage)   --advised of side effects and adverse effects of this medication  Contradictions: none  Reviewed labs  Will continue with vitamin d and b12 OTC  Anxiety, stable   Discussed shift-work sleep  disorder, stable   Wrote out macros and encouraged to track food   Encouraged to add in strength training   Nutrition: Low carb diet, recommended to eat breakfast daily/ regular protein intake  Follow up with dietitian and psychologist as recommended.  Discussed the role of sleep and stress in weight management.  Counseled on comprehensive weight loss plan including attention to nutrition, exercise and behavior/stress management for success. See patient instruction below for more details.  Discussed strategies to overcome barriers to successful weight loss and weight maintenance  FITTE: ACSM recommendations (150-300 minutes/ week in active weight loss)   Weight Loss Consent to treat reviewed and signed.    Total time spent on chart review, pre-charting, obtaining history, counseling, and educating, reviewing labs was 30 minutes.       NOTE TO PATIENT: The 21st Century Cures Act makes clinical notes like these available to patients in the interest of transparency. Clinical notes are medical documents used by physicians and care providers to communicate with each other. These documents include medical language and terminology, abbreviations, and treatment information that may sound technical and at times possibly unfamiliar. In addition, at times, the verbiage may appear blunt or direct. These documents are one tool providers use to communicate relevant information and clinical opinions of the care providers in a way that allows common understanding of the clinical context.     There are no Patient Instructions on file for this visit.    No follow-ups on file.    Patient verbalizes understanding.    IZABEL Rodriguez             [1]   Current Outpatient Medications on File Prior to Visit   Medication Sig Dispense Refill    WEGOVY 1.7 MG/0.75ML Subcutaneous Solution Auto-injector ADMINISTER 1.7 MG UNDER THE SKIN 1 TIME A WEEK 9 mL 0    hydrocortisone 2.5 % External Cream Place 1 Application rectally 2 (two) times  daily as needed for Hemorrhoids. 28 g 1    Cholecalciferol (VITAMIN D) 50 MCG (2000 UT) Oral Cap Take by mouth daily.      ELURYNG 0.12-0.015 MG/24HR Vaginal Ring        No current facility-administered medications on file prior to visit.

## 2024-12-13 NOTE — PATIENT INSTRUCTIONS
Next steps:  1.  Fill your prescribed medication and take as discussed and prescribed: wegovy 1.7mg weekly   2.  Schedule a personal nutrition consultation with one of our registered dieticians     Please try to work on the following dietary changes:  Daily protein recommendation to start:  grams  Daily carbohydrate: <110g  Daily calories: 1,200-1,300  1.  Drink water with meals and throughout the day, cut down on soda and/or juice if consumed. Consider flavored water options like Bubbly, Spindrift, Hint and Zheng.  2.  Eat breakfast daily and focus on having protein with each meal, examples include: greek yogurt, cottage cheese, hard boiled egg, whole grain toast with peanut butter.   3.  Reduce refined carbohydrates and sugars which includes items such as sweets, as well as rice, pasta, and bread and make sure to choose whole grain options when having them with just 1 serving per meal about the size of your inner palm.  4.  Consume non starchy veggies daily working towards making them a good 50% of your daily food intake. Add them to lunch and dinner consistently.  5.  Start a daily probiotic: VSL#3 is recommended, (order on line at www.vsl3.com). Take 1 capsule daily with water for 30 days, then reduce to 1 every other day (this will reduce the cost). Capsules can be left out for 2 weeks, but then must be refrigerated.      Please download allison My Fitness Pal, LoseIt! Or Net Diary to monitor daily dietary intake and you will be able to see if you are eating the right amount of calories or too much or too little which would hinder weight loss. Additionally this will help to see your daily carbohydrate and protein intake. When you set the allison up choose 1-2 lbs/week as a goal.  Keeping a paper food journal is an option as well to remain accountable for your choices- this is the start to mindful eating! A low calorie diet has been consistently shown to support weight loss.     Continue or start exercising to  help establish a routine. If not already exercising begin with 1 day and progress as able with long-term goal of 30 minutes 5 days a week at a minimum.     Meditation daily can help manage and control stress. Chronic stress can make weight loss difficult.  Exercising is one way to help with stress, but meditation using the CALM Radha or another comparable alternative can be done in your home or place of work with little time commitment. This Radha can also help work on behavior change and improve sleep. Check out the segment under Calm Masterclass and listen to The 4 Pillars of Health. A great way to begin learning about the foundation of lifestyle with practical tips to use in your every day.     Check out www.yourweightmatters.org blog for continued daily support and education along this weight loss journey!    Patient Resources:     Personal Training/Fitness Classes/Health Coaching     [facility] @ [email or web address] Full fitness center with group fitness and personal training. Discount available as client of [facility].  Health Coaching and Personal Training with Arianne Katz at our [facility]- individual weekly coaching with option to add personal training and small group fitness classes targeted at weight loss- [phone] and/or email @ [email or web address]  360[employer]IT [place] [email or web address] Group Fitness [phone] and/or email Zuly at [email or web address]  [facility] [place] with multiple locations: Prompt.ly (www.iOmando), Eat The Scatter Lab Fitness (www.Appiny.inContact), Fit Body Bootcamp (www.HyprKeybodyboSentimentp.inContact), Flavorvanil (www.iORGA Group.inContact), The Exercise  (www.exercisecoach.inContact)     Online Fitness  Fitness  on Utube  Fit in 10 DVD series- www.vzvbq65DUI.com  Sit and Be Fit - Chair exercise series Www.sitandbefit.org  Hip  Hop Fit with Kirit Forbes at www.hiphopfit.net     Apps for on the Go Fitness  [place] 7 Minute Workout (orange box with white 7) - free on the go HIIT training radha  Peloton Radha @ www.onepeloton.com     Nutrition Trackers and Tools  LoseIT! And My Fitness Pal apps and on line for tracking nutrition  NOOM - virtual health coaching  FitFoundation (healthy meals on the go) in [place] @ www.ikxbxhhycouox4e.[employer]  Bistro MD @ Dr. Z.bistromd.com and Andiam90 (keto and low carb plans recommended) @ www.[email or web address].com, Metabolic Meals @ www.MyMetabolicMeals.com - individual prepared meals to go  Gobble, Blue Apron, Home , Every Plate, Sunbasket- on line meal delivery programs for preparation at home  Meal Village in [place] for homemade meals to go @ www.mealvillage.[employer]  Diet Doctor @ www.dietdoctor.[employer] - low carb swaps  YuVisual IQ - meal prep and planning radha (www.yummly.com)     Stress Management/Behavior/Mindful Eating  CALM meditation radha (www.calm.com)  Headspace  Am I Hungry? Mindful eating virtual  radha  Www.yourweightmatters.org - Obesity Action Coalition sponsored Blog posts daily  Motivation radha (black box with white \")- daily supportive messages sent to your phone     Books/Video Education/Podcasts  Mindless Eating by Kuldeep Schaefer  Why We Get Sick by Kenny Che (a book about insulin resistance)  Atomic Habits by Shay Spencer (a book about taking small steps to promote greater behavior change)   Can't Hurt Me by Endy Zafar (a book exploring the power of discipline in achieving your goals)  The End of Dieting: How to Live for Life by Dr. Chapin Pham M.D. or listen to The [employer] Podcast Episode 63: Understanding \"Nutritarian\" Eating w/Dr. Chapin Pham  Your Body in Balance: The New Science of Food, Hormones, and Health by Dr. Billy Person  The Menopause Diet Plan by Naima Lopez and Leana Valles  The Complete Guide to fasting by Dr. Coulter  Sugar, Salt & Fat by Violette Cameron, Ph.D, R.D.  Weight  Loss Surgery Will Not Treat Food Addiction by Elli Rizvi Ph.D  The Game Changers- The Label [employer]ix Documentary on plant based nutrition  Fed Up - documentary about obesity (Free on Utube)  The Truth About Sugar - documentary on sugar (Free on Utube, https://youTendru.be/4R8jgfyCQ7v)  The Dr. Leo T5 Wellness Plan by Dr. Long Leo MD  Fitlosophy Fitspiration - journal to better health (found at [employer] in fitness aisle)  What Happened to You?- a look at the impact trauma has on behavior written by Vanessa Whipple and Dr. Chuck Young  Whole Again by Deniz Lau - discovering your true self after trauma  Tom Dean talk on [employer], The Call to Courage  Podcasts: The Exam Room by the Physician's Committee, Nutrition Facts by Dr. Walters    We are here to support you with weight loss, but please remember that you still need your primary care provider for your routine health maintenance.

## 2025-05-05 ENCOUNTER — TELEPHONE (OUTPATIENT)
Dept: INTERNAL MEDICINE CLINIC | Facility: CLINIC | Age: 25
End: 2025-05-05

## 2025-05-05 NOTE — TELEPHONE ENCOUNTER
Patient called to cancel her upcoming visits due to [place] of [place] a couple of months ago    Patient wants to ask Provider if she knows of any clinics in [place], [place] that she may recommend?    No future appointments.